# Patient Record
Sex: FEMALE | Race: WHITE | URBAN - METROPOLITAN AREA
[De-identification: names, ages, dates, MRNs, and addresses within clinical notes are randomized per-mention and may not be internally consistent; named-entity substitution may affect disease eponyms.]

---

## 2022-06-28 PROBLEM — R63.5 WEIGHT GAIN: Status: ACTIVE | Noted: 2017-11-22

## 2022-06-28 PROBLEM — N63.20 LEFT BREAST LUMP: Status: ACTIVE | Noted: 2017-11-22

## 2022-06-28 PROBLEM — N92.6 IRREGULAR MENSES: Status: ACTIVE | Noted: 2017-11-22

## 2022-06-28 PROBLEM — E28.2 PCOS (POLYCYSTIC OVARIAN SYNDROME): Status: ACTIVE | Noted: 2018-07-05

## 2022-06-28 PROBLEM — J30.9 ALLERGIC RHINITIS: Status: ACTIVE | Noted: 2022-06-28

## 2022-06-28 PROBLEM — N94.6 DYSMENORRHEA: Status: ACTIVE | Noted: 2017-11-22

## 2022-06-28 PROBLEM — N94.10 FEMALE DYSPAREUNIA: Status: ACTIVE | Noted: 2017-11-22

## 2022-06-28 PROBLEM — N94.89 HIGH-TONE PELVIC FLOOR DYSFUNCTION: Status: ACTIVE | Noted: 2017-11-22

## 2022-06-28 PROBLEM — N96 HISTORY OF RECURRENT MISCARRIAGES: Status: ACTIVE | Noted: 2021-03-13

## 2022-06-28 PROBLEM — M62.89 HIGH-TONE PELVIC FLOOR DYSFUNCTION: Status: ACTIVE | Noted: 2017-11-22

## 2022-06-29 ENCOUNTER — PATIENT MESSAGE (OUTPATIENT)
Dept: FAMILY MEDICINE CLINIC | Facility: CLINIC | Age: 32
End: 2022-06-29

## 2022-06-29 NOTE — TELEPHONE ENCOUNTER
From: Silvio Grubbs  To: Uriah Del Real DO  Sent: 6/29/2022 4:55 PM CDT  Subject: Brain Belts Dr. Vivi Starkey,    Thank you so much for being so great with my appointment. I meant to ask about drinking alcohol when on Prozac. I often will have a drink when going out with friends on the weekend. What is safe to do when taking Prozac? I didnt know if I could socially drink? Thank you.

## 2022-08-11 ENCOUNTER — PATIENT MESSAGE (OUTPATIENT)
Dept: FAMILY MEDICINE CLINIC | Facility: CLINIC | Age: 32
End: 2022-08-11

## 2022-08-11 DIAGNOSIS — N30.00 ACUTE CYSTITIS WITHOUT HEMATURIA: Primary | ICD-10-CM

## 2022-08-13 RX ORDER — SULFAMETHOXAZOLE AND TRIMETHOPRIM 800; 160 MG/1; MG/1
1 TABLET ORAL 2 TIMES DAILY
Qty: 6 TABLET | Refills: 0 | Status: SHIPPED | OUTPATIENT
Start: 2022-08-13

## 2022-08-16 ENCOUNTER — OFFICE VISIT (OUTPATIENT)
Dept: FAMILY MEDICINE CLINIC | Facility: CLINIC | Age: 32
End: 2022-08-16
Payer: COMMERCIAL

## 2022-08-16 ENCOUNTER — TELEPHONE (OUTPATIENT)
Dept: FAMILY MEDICINE CLINIC | Facility: CLINIC | Age: 32
End: 2022-08-16

## 2022-08-16 VITALS
DIASTOLIC BLOOD PRESSURE: 60 MMHG | WEIGHT: 152 LBS | BODY MASS INDEX: 23.86 KG/M2 | RESPIRATION RATE: 16 BRPM | HEART RATE: 78 BPM | SYSTOLIC BLOOD PRESSURE: 120 MMHG | HEIGHT: 67 IN | TEMPERATURE: 97 F

## 2022-08-16 DIAGNOSIS — M54.50 ACUTE BILATERAL LOW BACK PAIN WITHOUT SCIATICA: Primary | ICD-10-CM

## 2022-08-16 DIAGNOSIS — R10.9 FLANK PAIN: ICD-10-CM

## 2022-08-16 DIAGNOSIS — F41.1 GAD (GENERALIZED ANXIETY DISORDER): ICD-10-CM

## 2022-08-16 LAB
APPEARANCE: YELLOW
BILIRUBIN: NEGATIVE
GLUCOSE (URINE DIPSTICK): NEGATIVE MG/DL
KETONES (URINE DIPSTICK): NEGATIVE MG/DL
LEUKOCYTES: NEGATIVE
MULTISTIX LOT#: NORMAL NUMERIC
NITRITE, URINE: NEGATIVE
OCCULT BLOOD: NEGATIVE
PH, URINE: 7 (ref 4.5–8)
PROTEIN (URINE DIPSTICK): NEGATIVE MG/DL
SPECIFIC GRAVITY: 1.01 (ref 1–1.03)
URINE-COLOR: CLEAR
UROBILINOGEN,SEMI-QN: 0.2 MG/DL (ref 0–1.9)

## 2022-08-16 PROCEDURE — 3078F DIAST BP <80 MM HG: CPT | Performed by: FAMILY MEDICINE

## 2022-08-16 PROCEDURE — 87086 URINE CULTURE/COLONY COUNT: CPT | Performed by: FAMILY MEDICINE

## 2022-08-16 PROCEDURE — 81003 URINALYSIS AUTO W/O SCOPE: CPT | Performed by: FAMILY MEDICINE

## 2022-08-16 PROCEDURE — 3008F BODY MASS INDEX DOCD: CPT | Performed by: FAMILY MEDICINE

## 2022-08-16 PROCEDURE — 99214 OFFICE O/P EST MOD 30 MIN: CPT | Performed by: FAMILY MEDICINE

## 2022-08-16 PROCEDURE — 3074F SYST BP LT 130 MM HG: CPT | Performed by: FAMILY MEDICINE

## 2022-08-16 RX ORDER — NITROFURANTOIN 25; 75 MG/1; MG/1
CAPSULE ORAL
COMMUNITY
Start: 2022-08-12

## 2022-08-16 RX ORDER — BUPROPION HYDROCHLORIDE 300 MG/1
300 TABLET ORAL DAILY
Qty: 30 TABLET | Refills: 1 | Status: SHIPPED | OUTPATIENT
Start: 2022-08-16

## 2022-08-16 RX ORDER — SULFAMETHOXAZOLE AND TRIMETHOPRIM 800; 160 MG/1; MG/1
1 TABLET ORAL 2 TIMES DAILY
Qty: 14 TABLET | Refills: 0 | Status: SHIPPED | OUTPATIENT
Start: 2022-08-16

## 2022-08-16 NOTE — TELEPHONE ENCOUNTER
Coming in today at 15:00 to see Dr Marialuisa Villalta called [facility] to call back to see if she can keep her appointment

## 2022-08-16 NOTE — TELEPHONE ENCOUNTER
Pt calling. Recently seen at [facility] for UTI, sx have not resolved. Wants to know if she needs to be seen again.  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

## 2022-08-20 ENCOUNTER — PATIENT MESSAGE (OUTPATIENT)
Dept: FAMILY MEDICINE CLINIC | Facility: CLINIC | Age: 32
End: 2022-08-20

## 2022-08-22 ENCOUNTER — PATIENT MESSAGE (OUTPATIENT)
Dept: FAMILY MEDICINE CLINIC | Facility: CLINIC | Age: 32
End: 2022-08-22

## 2022-08-22 ENCOUNTER — TELEPHONE (OUTPATIENT)
Dept: FAMILY MEDICINE CLINIC | Facility: CLINIC | Age: 32
End: 2022-08-22

## 2022-08-22 DIAGNOSIS — N17.9 AKI (ACUTE KIDNEY INJURY) (HCC): Primary | ICD-10-CM

## 2022-08-22 DIAGNOSIS — D72.10 EOSINOPHILIA, UNSPECIFIED TYPE: ICD-10-CM

## 2022-08-22 NOTE — TELEPHONE ENCOUNTER
Dank Draper this may be interstitial nephritis caused by one of the medications. I agree with stopping both medications. I will put in the order for labs and a urine test to follow up. Let's try to get these soon. To stay hydrated. Please let me know if you have any questions.   Tomas Trevino,  8/22/2022 4:26 PM

## 2022-08-22 NOTE — TELEPHONE ENCOUNTER
LOV 8/16/22 with Dr. Altaf Gaspar. She was having urinary symptoms but urine testing was neg. Continued with persistent back and \"front\" pain and went to the [facility] ER Friday night. CT with contrast was normal as well as labs. Patient did review he own labs and creatinine and GFR are off. Her BM are not the same and not as much as usual. Denies constipation. Patient states that since the CT contrast Friday she has continued to have that warm feeling and metallic taste in her mouth. She is concerned she is having kidney issues. She spoke with a friend who is an MD. Was advised possible internal allergic reaction so she has stopped taking her bactrim and wellbutrin. Symptoms may have started prior to bactrim. Please advise on next steps. No availability in office. Instructed patient to IC if worsening.

## 2022-08-22 NOTE — TELEPHONE ENCOUNTER
From: Neeta Archer  To: Jose R DO José Luis  Sent: 8/20/2022 5:28 PM CDT  Subject: ER    Hi Dr. Sabine Roberts. I ended up going to the ER this weekend because of the pain I was experiencing. My ct scan was clear, but I am concerned with some of the lab results. My creatinine was at 1.21  My egfr was 46  And my eosinophils was a 6.7%    I am a little worried with these scores.

## 2022-08-22 NOTE — TELEPHONE ENCOUNTER
She did schedule a fup appointment she  went to [facility] on 08/19 for Issues with her kidneys she still has questions and wants some advise on how to proceed. Scheduled on [email or web address] with [email or web address]

## 2022-08-22 NOTE — TELEPHONE ENCOUNTER
From: Andrez Medina  Sent: 8/22/2022 7:45 AM CDT  To: [facility] Clinical Staff  Subject: ER    I did call Dr. Kathi Lesch and explained symptoms over the weekend. He is wondering if I am having an allergic reaction to the medications I am taking. I also started experiencing metallic taste last night and heat waves similar to the feeling you get with iodine in the system during CT. I made an appointment for Sept. 9th but am wondering if this is more of a medical emergency where I need to go back to the ER. I work today and my lunch is at 10:40. I am nervous my kidneys are not functioning correctly so just trying to find guidance of what to do in the meantime since I feel so sick. Thank you!

## 2022-08-24 LAB
ABSOLUTE BASOPHILS: 39 CELLS/UL (ref 0–200)
ABSOLUTE EOSINOPHILS: 323 CELLS/UL (ref 15–500)
ABSOLUTE LYMPHOCYTES: 1740 CELLS/UL (ref 850–3900)
ABSOLUTE MONOCYTES: 554 CELLS/UL (ref 200–950)
ABSOLUTE NEUTROPHILS: 2244 CELLS/UL (ref 1500–7800)
APPEARANCE: CLEAR
BASOPHILS: 0.8 %
BILIRUBIN: NEGATIVE
BUN/CREATININE RATIO: 11 (CALC) (ref 6–22)
BUN: 11 MG/DL (ref 7–25)
CALCIUM: 9.7 MG/DL (ref 8.6–10.2)
CARBON DIOXIDE: 27 MMOL/L (ref 20–32)
CHLORIDE: 106 MMOL/L (ref 98–110)
COLOR: YELLOW
CREATININE: 1 MG/DL (ref 0.5–0.97)
EGFR: 78 ML/MIN/1.73M2
EOSINOPHILS: 6.6 %
GLUCOSE: 88 MG/DL (ref 65–99)
GLUCOSE: NEGATIVE
HEMATOCRIT: 40 % (ref 35–45)
HEMOGLOBIN: 12.9 G/DL (ref 11.7–15.5)
KETONES: NEGATIVE
LEUKOCYTE ESTERASE: NEGATIVE
LYMPHOCYTES: 35.5 %
MCH: 29.4 PG (ref 27–33)
MCHC: 32.3 G/DL (ref 32–36)
MCV: 91.1 FL (ref 80–100)
MONOCYTES: 11.3 %
MPV: 10.7 FL (ref 7.5–12.5)
NEUTROPHILS: 45.8 %
NITRITE: NEGATIVE
OCCULT BLOOD: NEGATIVE
PH: 5.5 (ref 5–8)
PLATELET COUNT: 360 THOUSAND/UL (ref 140–400)
POTASSIUM: 4.6 MMOL/L (ref 3.5–5.3)
PROTEIN: NEGATIVE
RDW: 12.1 % (ref 11–15)
RED BLOOD CELL COUNT: 4.39 MILLION/UL (ref 3.8–5.1)
SODIUM: 139 MMOL/L (ref 135–146)
SPECIFIC GRAVITY: 1.01 (ref 1–1.03)
WHITE BLOOD CELL COUNT: 4.9 THOUSAND/UL (ref 3.8–10.8)

## 2022-08-25 NOTE — TELEPHONE ENCOUNTER
Pt reports new onset chest pain. Has h/o anxiety. Recently stopped Wellbutrin. Pain is constant. Pain started two nights ago. Pain rated 4/10, described as dull. Feels like heartburn. Denies SOB. She feels she can't take any medication due to suspected kidney issue. Is feeling anxious due to health issues going on. She is not sure if this is all connected to her kidney issue or if it is okay for her to take anything for it?     Routed to Dr. Erin Hayden     Pt requests we leave a detailed message with response

## 2022-08-25 NOTE — TELEPHONE ENCOUNTER
1. Good news- the eosinophils have come into normal range and Creatinine is also headed back into normal range. I think we should repeat again in a week to insure this is continuing in the right direction. 2. If feels like heart burn, can try famotidine 10 mg BID or 20mg once daily. If chest pain has any concerning features, then consider being evaluated. Please let me know if you have any questions.   Clyde Bynum, DO 8/25/2022 5:11 PM

## 2022-08-25 NOTE — TELEPHONE ENCOUNTER
Pt went and had the blood work done now she is having chest pain. Big toe is numb on the right side. She said she has been having kidney issues and avoiding OTC meds for indigestion.

## 2022-08-26 NOTE — TELEPHONE ENCOUNTER
Reviewed results and directives with pt. She verbalized understanding.     Pt's preferred lab is [facility]. Lab order for BMP and CBC reissued for [facility].

## 2022-09-01 ENCOUNTER — TELEPHONE (OUTPATIENT)
Dept: FAMILY MEDICINE CLINIC | Facility: CLINIC | Age: 32
End: 2022-09-01

## 2022-09-01 DIAGNOSIS — D72.10 EOSINOPHILIA, UNSPECIFIED TYPE: ICD-10-CM

## 2022-09-01 DIAGNOSIS — N17.9 AKI (ACUTE KIDNEY INJURY) (HCC): Primary | ICD-10-CM

## 2022-09-01 LAB
ABSOLUTE BASOPHILS: 43 CELLS/UL (ref 0–200)
ABSOLUTE EOSINOPHILS: 274 CELLS/UL (ref 15–500)
ABSOLUTE LYMPHOCYTES: 1354 CELLS/UL (ref 850–3900)
ABSOLUTE MONOCYTES: 785 CELLS/UL (ref 200–950)
ABSOLUTE NEUTROPHILS: 4745 CELLS/UL (ref 1500–7800)
BASOPHILS: 0.6 %
BUN/CREATININE RATIO: 10 (CALC) (ref 6–22)
BUN: 10 MG/DL (ref 7–25)
CALCIUM: 9.5 MG/DL (ref 8.6–10.2)
CARBON DIOXIDE: 28 MMOL/L (ref 20–32)
CHLORIDE: 107 MMOL/L (ref 98–110)
CREATININE: 1.04 MG/DL (ref 0.5–0.97)
EGFR: 74 ML/MIN/1.73M2
EOSINOPHILS: 3.8 %
GLUCOSE: 90 MG/DL (ref 65–99)
HEMATOCRIT: 40.1 % (ref 35–45)
HEMOGLOBIN: 12.9 G/DL (ref 11.7–15.5)
LYMPHOCYTES: 18.8 %
MCH: 29.3 PG (ref 27–33)
MCHC: 32.2 G/DL (ref 32–36)
MCV: 90.9 FL (ref 80–100)
MONOCYTES: 10.9 %
MPV: 10.7 FL (ref 7.5–12.5)
NEUTROPHILS: 65.9 %
PLATELET COUNT: 345 THOUSAND/UL (ref 140–400)
POTASSIUM: 4.4 MMOL/L (ref 3.5–5.3)
RDW: 12.3 % (ref 11–15)
RED BLOOD CELL COUNT: 4.41 MILLION/UL (ref 3.8–5.1)
SODIUM: 140 MMOL/L (ref 135–146)
WHITE BLOOD CELL COUNT: 7.2 THOUSAND/UL (ref 3.8–10.8)

## 2022-09-01 NOTE — TELEPHONE ENCOUNTER
BMP please  Please let me know if you have any questions.   [id number], DO 9/1/2022 5:35 PM

## 2022-09-01 NOTE — TELEPHONE ENCOUNTER
Pt states she completed her labs. Pt would like to speak to someone. Pt believes her kidneys are not getting better, in fact worse. Please advise. Call work ph with ext. Not able to get calls on cell.

## 2022-09-01 NOTE — TELEPHONE ENCOUNTER
Creatinine has stabilized. At this time I think we give the kidneys more time to recover. Eosinophils have gone back to normal and it is reassuring the creatinine has stabilized right around 1. This is an improvement from 1.2 where she was previously. I would recommend we recheck this again in 2 weeks. How is the patient feeling?

## 2022-09-01 NOTE — TELEPHONE ENCOUNTER
Patient notified of labs. She reports she's had some back pain. She feels better knowing labs are improving. Routed to Dr. Lesli Umanzor - which labs should be ordered for 2 week repeat?

## 2022-09-08 ENCOUNTER — TELEPHONE (OUTPATIENT)
Dept: FAMILY MEDICINE CLINIC | Facility: CLINIC | Age: 32
End: 2022-09-08

## 2022-09-08 ENCOUNTER — PATIENT MESSAGE (OUTPATIENT)
Dept: FAMILY MEDICINE CLINIC | Facility: CLINIC | Age: 32
End: 2022-09-08

## 2022-09-08 NOTE — TELEPHONE ENCOUNTER
From: Caitlyn Leung  Sent: 9/8/2022 3:16 PM CDT  To: [facility] 03 Clinical Staff  Subject: Test results from ER    Other lab results

## 2022-09-08 NOTE — TELEPHONE ENCOUNTER
Pt was seen at [facility] 9/4/22. Pt having chest pain and got EKG. Pt would like to speak to nurse about plan of care. Pt feels like she is getting disregarded at the ER because she deals with anxiety. She does not think it is panic attacks. Pt is wearing the chest halter monitor currently. Please advise Pt.

## 2022-09-08 NOTE — TELEPHONE ENCOUNTER
Called and talked to patient she has severe CP was in [address] [place] [address] work up was normal and she was released and placed on a Holter monitor for 48 hours we went over her EKG and what she should do from this point

## 2022-09-14 ENCOUNTER — OFFICE VISIT (OUTPATIENT)
Dept: NEPHROLOGY | Facility: CLINIC | Age: 32
End: 2022-09-14
Payer: COMMERCIAL

## 2022-09-14 VITALS — BODY MASS INDEX: 23 KG/M2 | SYSTOLIC BLOOD PRESSURE: 116 MMHG | WEIGHT: 150 LBS | DIASTOLIC BLOOD PRESSURE: 72 MMHG

## 2022-09-14 DIAGNOSIS — R79.89 ELEVATED SERUM CREATININE: Primary | ICD-10-CM

## 2022-09-14 PROCEDURE — 3078F DIAST BP <80 MM HG: CPT | Performed by: INTERNAL MEDICINE

## 2022-09-14 PROCEDURE — 99203 OFFICE O/P NEW LOW 30 MIN: CPT | Performed by: INTERNAL MEDICINE

## 2022-09-14 PROCEDURE — 3074F SYST BP LT 130 MM HG: CPT | Performed by: INTERNAL MEDICINE

## 2022-09-15 LAB
BUN: 13 MG/DL (ref 7–25)
CALCIUM: 9.6 MG/DL (ref 8.6–10.2)
CARBON DIOXIDE: 26 MMOL/L (ref 20–32)
CHLORIDE: 107 MMOL/L (ref 98–110)
CREATININE: 0.84 MG/DL (ref 0.5–0.97)
EGFR: 96 ML/MIN/1.73M2
GLUCOSE: 89 MG/DL (ref 65–99)
POTASSIUM: 4.3 MMOL/L (ref 3.5–5.3)
SODIUM: 139 MMOL/L (ref 135–146)

## 2022-10-06 ENCOUNTER — TELEPHONE (OUTPATIENT)
Dept: FAMILY MEDICINE CLINIC | Facility: CLINIC | Age: 32
End: 2022-10-06

## 2022-10-06 NOTE — TELEPHONE ENCOUNTER
C/o severe chest pain seen in ED cardiac work up neg but at night having chest pain at night it keeps her up at night but he O2 Sat gets down to 80 she thinks she might have asthma because her mother had adult onset asthma. She was asking for an inhaler, but no wheezing. She is wearing a 30 day holter and is having a Stress test next week also a CT to R/O chest problems. I suggested she try Ibuprofen for the pain prior to going to bed. She said the difficulty breathing was nasal so I asked her to try a nasal spray before bed also. She is asking to get lab work done before her appointment with Dr Yelitza Sutherland.

## 2022-10-06 NOTE — TELEPHONE ENCOUNTER
Pt is having intense chest pain non heart related possible asthma. She is requesting to speak with a nurse for a possible inhaler script.

## 2022-10-07 NOTE — TELEPHONE ENCOUNTER
Kidney not showing any issues at all on the most recent blood work. Sounds like she might need to be seen sooner than end of the month. I can see today at 10am if needed.

## 2022-10-07 NOTE — TELEPHONE ENCOUNTER
Patient returned call. Patient notified of below directives. Will schedule sooner f/u. Unfortunately missed 10 am appt opportunity for today. DISPLAY PLAN FREE TEXT

## 2022-10-10 ENCOUNTER — TELEPHONE (OUTPATIENT)
Dept: FAMILY MEDICINE CLINIC | Facility: CLINIC | Age: 32
End: 2022-10-10

## 2022-10-10 NOTE — TELEPHONE ENCOUNTER
Pt is requesting an order to get blood tests prior to her appt tomorrow 10/11/22. Please advise Pt if these can be ordered or if she should wait until after she sees Dr. Nat Soriano tomorrow.

## 2022-10-11 ENCOUNTER — OFFICE VISIT (OUTPATIENT)
Dept: FAMILY MEDICINE CLINIC | Facility: CLINIC | Age: 32
End: 2022-10-11
Payer: COMMERCIAL

## 2022-10-11 ENCOUNTER — TELEPHONE (OUTPATIENT)
Dept: FAMILY MEDICINE CLINIC | Facility: CLINIC | Age: 32
End: 2022-10-11

## 2022-10-11 VITALS
SYSTOLIC BLOOD PRESSURE: 104 MMHG | HEIGHT: 67 IN | WEIGHT: 151 LBS | HEART RATE: 72 BPM | BODY MASS INDEX: 23.7 KG/M2 | OXYGEN SATURATION: 90 % | DIASTOLIC BLOOD PRESSURE: 58 MMHG | TEMPERATURE: 98 F | RESPIRATION RATE: 12 BRPM

## 2022-10-11 DIAGNOSIS — R53.83 FATIGUE, UNSPECIFIED TYPE: Primary | ICD-10-CM

## 2022-10-11 DIAGNOSIS — R07.89 ATYPICAL CHEST PAIN: ICD-10-CM

## 2022-10-11 DIAGNOSIS — R06.02 SHORTNESS OF BREATH: ICD-10-CM

## 2022-10-11 PROCEDURE — 99215 OFFICE O/P EST HI 40 MIN: CPT | Performed by: FAMILY MEDICINE

## 2022-10-11 PROCEDURE — 3074F SYST BP LT 130 MM HG: CPT | Performed by: FAMILY MEDICINE

## 2022-10-11 PROCEDURE — 3008F BODY MASS INDEX DOCD: CPT | Performed by: FAMILY MEDICINE

## 2022-10-11 PROCEDURE — 3078F DIAST BP <80 MM HG: CPT | Performed by: FAMILY MEDICINE

## 2022-10-11 RX ORDER — OMEPRAZOLE 20 MG/1
20 CAPSULE, DELAYED RELEASE ORAL
Qty: 60 CAPSULE | Refills: 1 | Status: SHIPPED | OUTPATIENT
Start: 2022-10-11

## 2022-10-11 RX ORDER — ALBUTEROL SULFATE 90 UG/1
2 AEROSOL, METERED RESPIRATORY (INHALATION) EVERY 6 HOURS PRN
Qty: 18 G | Refills: 0 | Status: SHIPPED | OUTPATIENT
Start: 2022-10-11

## 2022-10-11 NOTE — TELEPHONE ENCOUNTER
Received fax from [facility] with patient's US Venous Duplex Lower EXT BI test results, placed in Dr. Cinthya Campa in box

## 2022-10-13 LAB
ABSOLUTE BASOPHILS: 48 CELLS/UL (ref 0–200)
ABSOLUTE EOSINOPHILS: 348 CELLS/UL (ref 15–500)
ABSOLUTE LYMPHOCYTES: 1848 CELLS/UL (ref 850–3900)
ABSOLUTE MONOCYTES: 618 CELLS/UL (ref 200–950)
ABSOLUTE NEUTROPHILS: 3138 CELLS/UL (ref 1500–7800)
ALBUMIN/GLOBULIN RATIO: 1.7 (CALC) (ref 1–2.5)
ALBUMIN: 4.2 G/DL (ref 3.6–5.1)
ALKALINE PHOSPHATASE: 62 U/L (ref 31–125)
ALT: 11 U/L (ref 6–29)
AST: 15 U/L (ref 10–30)
BASOPHILS: 0.8 %
BILIRUBIN, TOTAL: 0.6 MG/DL (ref 0.2–1.2)
BUN: 14 MG/DL (ref 7–25)
CALCIUM: 9.6 MG/DL (ref 8.6–10.2)
CARBON DIOXIDE: 22 MMOL/L (ref 20–32)
CHLORIDE: 107 MMOL/L (ref 98–110)
CREATININE: 0.93 MG/DL (ref 0.5–0.97)
EGFR: 85 ML/MIN/1.73M2
EOSINOPHILS: 5.8 %
FERRITIN: 35 NG/ML (ref 16–154)
GLOBULIN: 2.5 G/DL (CALC) (ref 1.9–3.7)
GLUCOSE: 85 MG/DL (ref 65–99)
HEMATOCRIT: 41.3 % (ref 35–45)
HEMOGLOBIN: 13.6 G/DL (ref 11.7–15.5)
LYMPHOCYTES: 30.8 %
MCH: 29.8 PG (ref 27–33)
MCHC: 32.9 G/DL (ref 32–36)
MCV: 90.6 FL (ref 80–100)
MONOCYTES: 10.3 %
MPV: 11 FL (ref 7.5–12.5)
NEUTROPHILS: 52.3 %
PLATELET COUNT: 350 THOUSAND/UL (ref 140–400)
POTASSIUM: 4.5 MMOL/L (ref 3.5–5.3)
PROTEIN, TOTAL: 6.7 G/DL (ref 6.1–8.1)
RDW: 12.3 % (ref 11–15)
RED BLOOD CELL COUNT: 4.56 MILLION/UL (ref 3.8–5.1)
SODIUM: 138 MMOL/L (ref 135–146)
TSH W/REFLEX TO FT4: 2.17 MIU/L
WHITE BLOOD CELL COUNT: 6 THOUSAND/UL (ref 3.8–10.8)

## 2022-11-01 ENCOUNTER — PATIENT MESSAGE (OUTPATIENT)
Dept: FAMILY MEDICINE CLINIC | Facility: CLINIC | Age: 32
End: 2022-11-01

## 2022-11-01 DIAGNOSIS — Z01.419 WELL WOMAN EXAM WITH ROUTINE GYNECOLOGICAL EXAM: Primary | ICD-10-CM

## 2022-11-01 DIAGNOSIS — R07.89 ATYPICAL CHEST PAIN: ICD-10-CM

## 2022-11-01 DIAGNOSIS — R06.02 SHORTNESS OF BREATH: ICD-10-CM

## 2022-11-02 ENCOUNTER — OFFICE VISIT (OUTPATIENT)
Facility: CLINIC | Age: 32
End: 2022-11-02
Payer: COMMERCIAL

## 2022-11-02 ENCOUNTER — TELEPHONE (OUTPATIENT)
Facility: CLINIC | Age: 32
End: 2022-11-02

## 2022-11-02 ENCOUNTER — HOSPITAL ENCOUNTER (OUTPATIENT)
Dept: GENERAL RADIOLOGY | Facility: HOSPITAL | Age: 32
Discharge: HOME | End: 2022-11-02
Attending: FAMILY MEDICINE
Payer: COMMERCIAL

## 2022-11-02 VITALS
DIASTOLIC BLOOD PRESSURE: 70 MMHG | HEIGHT: 67 IN | BODY MASS INDEX: 23.34 KG/M2 | HEART RATE: 73 BPM | WEIGHT: 148.69 LBS | SYSTOLIC BLOOD PRESSURE: 103 MMHG

## 2022-11-02 DIAGNOSIS — R07.89 OTHER CHEST PAIN: Primary | ICD-10-CM

## 2022-11-02 DIAGNOSIS — R10.13 EPIGASTRIC ABDOMINAL PAIN: Primary | ICD-10-CM

## 2022-11-02 DIAGNOSIS — R07.89 ATYPICAL CHEST PAIN: ICD-10-CM

## 2022-11-02 DIAGNOSIS — R06.02 SHORTNESS OF BREATH: ICD-10-CM

## 2022-11-02 PROCEDURE — 3074F SYST BP LT 130 MM HG: CPT | Performed by: STUDENT IN AN ORGANIZED HEALTH CARE EDUCATION/TRAINING PROGRAM

## 2022-11-02 PROCEDURE — 3008F BODY MASS INDEX DOCD: CPT | Performed by: STUDENT IN AN ORGANIZED HEALTH CARE EDUCATION/TRAINING PROGRAM

## 2022-11-02 PROCEDURE — 3078F DIAST BP <80 MM HG: CPT | Performed by: STUDENT IN AN ORGANIZED HEALTH CARE EDUCATION/TRAINING PROGRAM

## 2022-11-02 PROCEDURE — 71046 X-RAY EXAM CHEST 2 VIEWS: CPT | Performed by: FAMILY MEDICINE

## 2022-11-02 PROCEDURE — 99204 OFFICE O/P NEW MOD 45 MIN: CPT | Performed by: STUDENT IN AN ORGANIZED HEALTH CARE EDUCATION/TRAINING PROGRAM

## 2022-11-02 RX ORDER — FLUTICASONE FUROATE 200 UG/1
1 POWDER RESPIRATORY (INHALATION) DAILY
COMMUNITY
Start: 2022-10-29

## 2022-11-02 NOTE — PATIENT INSTRUCTIONS
1. Schedule upper endoscopy (EGD) with MAC [Diagnosis: epigastric pain]    2. You MAY need to go for COVID testing 72 hours before procedure. The testing team will call you a few days before your procedure to discuss with you if testing is required. If you are asked to go for COVID testing and do not completed the test, the procedure cannot be performed. 3. If you start any NEW medication after your visit today, please notify us. Certain medications will need to be held before the procedure, or the procedure cannot be performed.

## 2022-11-02 NOTE — TELEPHONE ENCOUNTER
Scheduled for:  Jori Braden [id number]   Provider Name:  Renay Montemayor  Date:  11/18/2022  Location:  [facility]   Sedation:  Mac   Time:  3:30 Pm  (pt is aware to arrive at 2:30 Pm)   Prep: Egd -Npo 3 hrs prior to procedure  Prep instructions were given to pt in the office, pt verbalized understanding. Meds/Allergies Reconciled?:  Physician reviewed     Diagnosis with codes:  Epigastric pain R10.13  Was patient informed to call insurance with codes (Y/N):  Yes, I confirmed [address] [place] [address] insurance with the patient. The patient also verbally understands to call her insurance to check for pre-cert, codes were given on prep instructions. Referral sent?:  Yes  [facility] or [address] notified?: Nagi Pena approved this  I sent an electronic request to Endo Scheduling and received a confirmation today. Medication Orders: This patient verbally confirmed that she is not taking:   Iron, blood thinners, BP meds, and is not diabetic   Not latex allergy, has PCN allergy and does not have a pacemaker Pt is aware to NOT take iron pills, herbal meds and diet supplements for 7 days before exam. Also to NOT take any form of alcohol, recreational drugs and any forms of ED meds 24 hours before exam.    Misc Orders:  Patient was informed that they will need a COVID 19 test prior to their procedure. Patient verbally understood & will await a phone call from [facility] to schedule.       Further instructions given by staff:
Gen: middle aged male, on oxygen nasal cannula, getting dialysis, mild distress, short of breath while talking  HEENT: MMM  CVS: S1S2 RRR  PULM: crackles at bases, decreased lung sounds at the bases  ABD: soft, nontender, no rebound, no guarding  EXTREM: no edema  NEURO: intact  PSYCH: appropriate  SKIN: warm to touch

## 2022-11-04 ENCOUNTER — PATIENT MESSAGE (OUTPATIENT)
Facility: CLINIC | Age: 32
End: 2022-11-04

## 2022-11-09 ENCOUNTER — TELEPHONE (OUTPATIENT)
Facility: CLINIC | Age: 32
End: 2022-11-09

## 2022-11-09 ENCOUNTER — TELEPHONE (OUTPATIENT)
Dept: FAMILY MEDICINE CLINIC | Facility: CLINIC | Age: 32
End: 2022-11-09

## 2022-11-09 DIAGNOSIS — M25.531 BILATERAL WRIST PAIN: Primary | ICD-10-CM

## 2022-11-09 DIAGNOSIS — R10.9 ABDOMINAL PAIN, UNSPECIFIED ABDOMINAL LOCATION: ICD-10-CM

## 2022-11-09 DIAGNOSIS — R19.7 DIARRHEA, UNSPECIFIED TYPE: Primary | ICD-10-CM

## 2022-11-09 DIAGNOSIS — M25.532 BILATERAL WRIST PAIN: Primary | ICD-10-CM

## 2022-11-09 NOTE — TELEPHONE ENCOUNTER
Pt states now has bad bilateral wrist/hand pain and wanted to know if was from the Omeprazole. Advised its not a usual side effect. Pt saw GI any they do not thing her chest pains are from [place]- advised that they would order an endoscopy- if she wanted. Pt states has seen many specialist and no one can figure out what's wrong with her. Pt wanted to know if all these symptoms are auto immune related and wanted to know if Dr. Remy Hanley can order labs for auto immune.

## 2022-11-09 NOTE — TELEPHONE ENCOUNTER
Pt states that she is having hand and wrist pain and is also having issues with diarrhea. Pt thinks it might be due to omeprazole. Please call.

## 2022-11-09 NOTE — TELEPHONE ENCOUNTER
Patient believes she may be having a reaction to Omeprazole. Patient has been having severe hand and wrist pain for the past couple days and diarrhea for the past 2 days.   Patient read that if you're taking that medication and having those symptoms you should contact your doctor's office

## 2022-11-09 NOTE — TELEPHONE ENCOUNTER
Dr. Miriam Allison,    Patient called stating she has been having bad wrist/hand pain and diarrhea after starting omeprazole. She wants to know if this could be causing her symptoms. Please advise, thank you.

## 2022-11-10 NOTE — TELEPHONE ENCOUNTER
I spoke to the patient. We discussed her symptoms. She is having right wrist pain/pain in her thumb. She also has had 2 days of diarrhea. Has been taking Omeprazole for > 1.5 months at this time. I discussed with her that these symptoms are unlikely related to Omeprazole, especially the joint pain but anything is possible. We discussed her concerns for underlying autoimmune disease. I will check celiac serologies, CRP, fecal calprotectin and we will proceed with EGD as previously scheduled. She will stop Omeprazole at this time.     Jovan Cr MD

## 2022-11-14 LAB
ANA SCREEN, IFA: NEGATIVE
C-REACTIVE PROTEIN: 0.6 MG/L
RHEUMATOID FACTOR: <14 IU/ML
SED RATE BY MODIFIED$WESTERGREN: 2 MM/H

## 2022-11-15 ENCOUNTER — LAB ENCOUNTER (OUTPATIENT)
Dept: LAB | Facility: HOSPITAL | Age: 32
End: 2022-11-15
Attending: STUDENT IN AN ORGANIZED HEALTH CARE EDUCATION/TRAINING PROGRAM
Payer: COMMERCIAL

## 2022-11-15 DIAGNOSIS — Z01.818 PRE-OP TESTING: ICD-10-CM

## 2022-11-15 LAB
IGA SERPL-MCNC: 273 MG/DL (ref 70–312)
IGM SERPL-MCNC: 114 MG/DL (ref 43–279)
IMMUNOGLOBULIN PNL SER-MCNC: 966 MG/DL (ref 791–1643)
VIT B12 SERPL-MCNC: 417 PG/ML (ref 193–986)

## 2022-11-15 PROCEDURE — 86364 TISS TRNSGLTMNASE EA IG CLAS: CPT | Performed by: STUDENT IN AN ORGANIZED HEALTH CARE EDUCATION/TRAINING PROGRAM

## 2022-11-15 PROCEDURE — 82784 ASSAY IGA/IGD/IGG/IGM EACH: CPT | Performed by: STUDENT IN AN ORGANIZED HEALTH CARE EDUCATION/TRAINING PROGRAM

## 2022-11-15 PROCEDURE — 36415 COLL VENOUS BLD VENIPUNCTURE: CPT | Performed by: STUDENT IN AN ORGANIZED HEALTH CARE EDUCATION/TRAINING PROGRAM

## 2022-11-15 PROCEDURE — 82607 VITAMIN B-12: CPT | Performed by: STUDENT IN AN ORGANIZED HEALTH CARE EDUCATION/TRAINING PROGRAM

## 2022-11-16 ENCOUNTER — TELEPHONE (OUTPATIENT)
Facility: CLINIC | Age: 32
End: 2022-11-16

## 2022-11-16 LAB — SARS-COV-2 RNA RESP QL NAA+PROBE: NOT DETECTED

## 2022-11-16 NOTE — TELEPHONE ENCOUNTER
Immunization chart prep completed.  Immunization records verified.  [place] Pay due for All Vacinations Up To Date No Vaccinations Needed   Patient states she is stuffy and has 11/18/2022 EGD - states she has sinus issue and wanted to know if this will interfere with procedure. Please call - ok to leave detailed message. Thank you.

## 2022-11-17 ENCOUNTER — OFFICE VISIT (OUTPATIENT)
Dept: FAMILY MEDICINE CLINIC | Facility: CLINIC | Age: 32
End: 2022-11-17
Payer: COMMERCIAL

## 2022-11-17 ENCOUNTER — ANESTHESIA EVENT (OUTPATIENT)
Dept: ENDOSCOPY | Facility: HOSPITAL | Age: 32
End: 2022-11-17
Payer: COMMERCIAL

## 2022-11-17 VITALS
TEMPERATURE: 98 F | RESPIRATION RATE: 18 BRPM | DIASTOLIC BLOOD PRESSURE: 68 MMHG | BODY MASS INDEX: 23.54 KG/M2 | WEIGHT: 150 LBS | HEART RATE: 61 BPM | SYSTOLIC BLOOD PRESSURE: 100 MMHG | HEIGHT: 67 IN | OXYGEN SATURATION: 100 %

## 2022-11-17 DIAGNOSIS — R22.31 LUMP OF SKIN OF RIGHT UPPER EXTREMITY: Primary | ICD-10-CM

## 2022-11-17 PROCEDURE — 99213 OFFICE O/P EST LOW 20 MIN: CPT | Performed by: STUDENT IN AN ORGANIZED HEALTH CARE EDUCATION/TRAINING PROGRAM

## 2022-11-17 PROCEDURE — 3008F BODY MASS INDEX DOCD: CPT | Performed by: STUDENT IN AN ORGANIZED HEALTH CARE EDUCATION/TRAINING PROGRAM

## 2022-11-17 PROCEDURE — 3074F SYST BP LT 130 MM HG: CPT | Performed by: STUDENT IN AN ORGANIZED HEALTH CARE EDUCATION/TRAINING PROGRAM

## 2022-11-17 PROCEDURE — 3078F DIAST BP <80 MM HG: CPT | Performed by: STUDENT IN AN ORGANIZED HEALTH CARE EDUCATION/TRAINING PROGRAM

## 2022-11-17 NOTE — TELEPHONE ENCOUNTER
Seasonal allergies are not a problem prior to the EGD. I attempted to call her this morning to further discuss but she did not answer. If she is feeling better this morning there is no contraindication to proceeding with upper endoscopy. If she calls back let her know the above. I told the patient to call the office/send a mychart to update us.

## 2022-11-18 ENCOUNTER — LAB ENCOUNTER (OUTPATIENT)
Dept: LAB | Facility: HOSPITAL | Age: 32
End: 2022-11-18
Attending: STUDENT IN AN ORGANIZED HEALTH CARE EDUCATION/TRAINING PROGRAM
Payer: COMMERCIAL

## 2022-11-18 ENCOUNTER — HOSPITAL ENCOUNTER (OUTPATIENT)
Facility: HOSPITAL | Age: 32
Setting detail: HOSPITAL OUTPATIENT SURGERY
Discharge: HOME | End: 2022-11-18
Attending: STUDENT IN AN ORGANIZED HEALTH CARE EDUCATION/TRAINING PROGRAM | Admitting: STUDENT IN AN ORGANIZED HEALTH CARE EDUCATION/TRAINING PROGRAM
Payer: COMMERCIAL

## 2022-11-18 ENCOUNTER — ANESTHESIA (OUTPATIENT)
Dept: ENDOSCOPY | Facility: HOSPITAL | Age: 32
End: 2022-11-18
Payer: COMMERCIAL

## 2022-11-18 VITALS
BODY MASS INDEX: 23.23 KG/M2 | RESPIRATION RATE: 22 BRPM | WEIGHT: 148 LBS | TEMPERATURE: 98 F | SYSTOLIC BLOOD PRESSURE: 104 MMHG | HEART RATE: 68 BPM | HEIGHT: 67 IN | OXYGEN SATURATION: 98 % | DIASTOLIC BLOOD PRESSURE: 67 MMHG

## 2022-11-18 DIAGNOSIS — R19.7 DIARRHEA OF PRESUMED INFECTIOUS ORIGIN: Primary | ICD-10-CM

## 2022-11-18 DIAGNOSIS — R10.13 EPIGASTRIC ABDOMINAL PAIN: ICD-10-CM

## 2022-11-18 DIAGNOSIS — Z01.818 PRE-OP TESTING: Primary | ICD-10-CM

## 2022-11-18 LAB
B-HCG UR QL: NEGATIVE
TTG IGA SER-ACNC: 2.9 U/ML (ref ?–7)

## 2022-11-18 PROCEDURE — 0DB78ZX EXCISION OF STOMACH, PYLORUS, VIA NATURAL OR ARTIFICIAL OPENING ENDOSCOPIC, DIAGNOSTIC: ICD-10-PCS | Performed by: STUDENT IN AN ORGANIZED HEALTH CARE EDUCATION/TRAINING PROGRAM

## 2022-11-18 PROCEDURE — 0DB58ZX EXCISION OF ESOPHAGUS, VIA NATURAL OR ARTIFICIAL OPENING ENDOSCOPIC, DIAGNOSTIC: ICD-10-PCS | Performed by: STUDENT IN AN ORGANIZED HEALTH CARE EDUCATION/TRAINING PROGRAM

## 2022-11-18 PROCEDURE — 0DB98ZX EXCISION OF DUODENUM, VIA NATURAL OR ARTIFICIAL OPENING ENDOSCOPIC, DIAGNOSTIC: ICD-10-PCS | Performed by: STUDENT IN AN ORGANIZED HEALTH CARE EDUCATION/TRAINING PROGRAM

## 2022-11-18 PROCEDURE — 83993 ASSAY FOR CALPROTECTIN FECAL: CPT

## 2022-11-18 PROCEDURE — 43239 EGD BIOPSY SINGLE/MULTIPLE: CPT | Performed by: STUDENT IN AN ORGANIZED HEALTH CARE EDUCATION/TRAINING PROGRAM

## 2022-11-18 RX ORDER — SODIUM CHLORIDE, SODIUM LACTATE, POTASSIUM CHLORIDE, CALCIUM CHLORIDE 600; 310; 30; 20 MG/100ML; MG/100ML; MG/100ML; MG/100ML
INJECTION, SOLUTION INTRAVENOUS CONTINUOUS
OUTPATIENT
Start: 2022-11-18

## 2022-11-18 RX ORDER — SODIUM CHLORIDE, SODIUM LACTATE, POTASSIUM CHLORIDE, CALCIUM CHLORIDE 600; 310; 30; 20 MG/100ML; MG/100ML; MG/100ML; MG/100ML
INJECTION, SOLUTION INTRAVENOUS CONTINUOUS
Status: DISCONTINUED | OUTPATIENT
Start: 2022-11-18 | End: 2022-11-18

## 2022-11-18 RX ORDER — NALOXONE HYDROCHLORIDE 0.4 MG/ML
80 INJECTION, SOLUTION INTRAMUSCULAR; INTRAVENOUS; SUBCUTANEOUS AS NEEDED
OUTPATIENT
Start: 2022-11-18 | End: 2022-11-18

## 2022-11-18 RX ADMIN — SODIUM CHLORIDE, SODIUM LACTATE, POTASSIUM CHLORIDE, CALCIUM CHLORIDE: 600; 310; 30; 20 INJECTION, SOLUTION INTRAVENOUS at 10:25:00

## 2022-11-18 NOTE — DISCHARGE INSTRUCTIONS
Home Care Instructions for Gastroscopy with Sedation    Diet:  - Resume your regular diet as tolerated unless otherwise instructed. - Start with light meals to minimize bloating.  - Do not drink alcohol today. Medication:  - If you have questions about resuming your normal medications, please contact your Primary Care Physician. Activities:  - Take it easy today. Do not return to work today. - Do not drive today. - Do not operate any machinery today (including kitchen equipment). Gastroscopy:  - You may have a sore throat for 2-3 days following the exam. This is normal. Gargling with warm salt water (1/2 tsp salt to 1 glass warm water) or using throat lozenges will help. - If you experience any sharp pain in your neck, abdomen or chest, vomiting of blood, oral temperature over 100 degrees Fahrenheit, light-headedness or dizziness, or any other problems, contact your doctor. **If unable to reach your doctor, please go to the [facility] Emergency Room**    - Your referring physician will receive a full report of your examination.  - If you do not hear from your doctor's office within two weeks of your biopsy, please call them for your results. You may be able to see your laboratory results in [facility] between 4 and 7 business days. In some cases, your physician may not have viewed the results before they are released to [address] If you have questions regarding your results contact the physician who ordered the test/exam by phone or via [address] by choosing \"Ask a Medical Question. \"

## 2022-11-18 NOTE — OPERATIVE REPORT
ESOPHAGOGASTRODUODENOSCOPY (EGD) REPORT    Prachi Lei     1990 Age 29year old   PCP Clyde Bynum DO Endoscopist Jean Claude Nixon MD       Date of procedure: 22    Procedure: EGD w/ biopsies    Pre-operative diagnosis: atypical chest pain    Post-operative diagnosis: same    Medications: MAC    Complications: none    Procedure:  Informed consent was obtained from the patient after the risks of the procedure were discussed, including but not limited to bleeding, perforation, aspiration, infection, or possibility of a missed lesion. After discussions of the risks/benefits and alternatives to this procedure, as well as the planned sedation, the patient was placed in the left lateral decubitus position and begun on continuous blood pressure pulse oximetry and EKG monitoring and this was maintained throughout the procedure. Once an adequate level of sedation was obtained a bite block was placed. Then the lubricated tip of the [id number] diagnostic video upper endoscope was inserted and advanced using direct visualization into the posterior pharynx and ultimately into the esophagus. The scope was then advanced through the stomach and to the second portion of the duodenum. Complications: None    Findings:      1. Esophagus: The squamocolumnar junction was noted at 38 cm and appeared regular. The diaphragmatic pinch was noted noted at 39 cm from the incisors. 1 cm sliding hiatal hernia. The esophageal mucosa appeared healthy and normal. There was no endoscopic findings of esophagitis, stricture or Sultana's esophagus. The esophagus was widely patent with no resistance to scope passage. There was no evidence of inflammation at the the GE junction. Biopsies were taken throughout the esophagus for histology. 2. Stomach: The stomach distended normally. Normal rugal folds were seen. The pylorus was patent.  The gastric mucosa appeared healthy and normal. Retroflexion revealed a normal fundus. There was no ulcers or erosions seen in the entire stomach. Biopsies were taken from the antrum, angularis, body for histology. Hill grade 1.    3. Duodenum: The duodenal mucosa appeared normal in the duodenal bulb. The second portion of the duodenum was notable for small white flecks covering otherwise healthy mucosa. The mucosa appeared healthy and normal otherwise. There was no associated erythema, edema, ulceration. Biopsies were taken with a cold forceps for histology. Impression:  1. Normal appearing esophagus. Biopsied. 2. Small hiatal hernia. 3. Normal appearing stomach. Biopsied. 4. Duodenal mucosa with small white flecks covering otherwise normal appearing mucosa. Possibly lymphangiectasia vs normal variant for this patient. Given atypical symptoms, biopsies were taken with a cold forceps for histology. There was no evidence of esophagitis today while off PPI therapy, therefore cannot make a diagnosis of erosive esophagitis and unclear if she truly has increased gastric acid exposure in the esophagus. Symptoms are rather atypical, intermittent and associated with other symptoms such as joint pains, anxiety, etc. Gastroesophageal reflux disease does not explain all symptoms, I would favor additional rheumatologic workup/further investigation from PCP for her symptoms. If there is ongoing concern that atypical chest pain is related to acid exposure can consider wireless esophageal pH testing. Recommend:  1. Await pathology results. 2. Agree with rheumatology workup  3. Can hold off on Omeprazole since it did not improve your symptoms previously. 4. Consider wireless esophageal pH testing in the future if ongoing concern for acid reflux disease causing symptoms.    >>>If tissue was sampled/removed and you have not received your pathology results either by phone or letter within 2 weeks, please call our office at [phone]. Specimens:  Duodenum, stomach, esophagus.     Blood loss: <1 ml

## 2022-11-18 NOTE — ANESTHESIA POSTPROCEDURE EVALUATION
Patient: Esa Dominguez    Procedure Summary     Date: 11/18/22 Room / Location: [address] [place] [address] ENDOSCOPY 04 / [address] [place] [address] ENDOSCOPY    Anesthesia Start: 1594 Anesthesia Stop:     Procedure: ESOPHAGOGASTRODUODENOSCOPY (EGD) Diagnosis:       Epigastric abdominal pain      (Normal EGD)    Surgeons: Paual Baca MD Anesthesiologist: Pearl Vieira CRNA    Anesthesia Type: MAC ASA Status: 2          Anesthesia Type: No value filed.     Vitals Value Taken Time   /62 11/18/22 1141   Temp 98.4 11/18/22 1141   Pulse 80 11/18/22 1141   Resp 18 11/18/22 1141   SpO2 97 11/18/22 1141       [facility] AN Post Evaluation:   Patient Evaluated in PACU  Patient Participation: complete - patient participated  Level of Consciousness: sleepy but conscious  Pain Score: 0  Pain Management: adequate  Airway Patency:patent  Dental exam unchanged from preop  Yes    Cardiovascular Status: acceptable  Respiratory Status: acceptable  Postoperative Hydration acceptable  Comments:  Report to Shari Linares CRNA  11/18/2022 11:41 AM

## 2022-11-21 ENCOUNTER — MED REC SCAN ONLY (OUTPATIENT)
Facility: CLINIC | Age: 32
End: 2022-11-21

## 2022-11-21 LAB — CALPROTECTIN STL-MCNT: 5.59 ΜG/G (ref ?–50)

## 2022-11-21 NOTE — PROGRESS NOTES
Called patient, left a voicemail. Unremarkable Egd with unrevealing biopsies. We already discussed this after the EGD last Friday. I told her to send me a mychart message with questions.

## 2022-11-29 PROBLEM — E28.2 POLYCYSTIC OVARIES: Status: ACTIVE | Noted: 2018-07-05

## 2022-11-29 PROBLEM — N80.9 ENDOMETRIOSIS: Status: ACTIVE | Noted: 2022-11-29

## 2022-11-29 PROBLEM — F41.9 ANXIETY: Status: ACTIVE | Noted: 2022-11-29

## 2022-12-01 ENCOUNTER — TELEPHONE (OUTPATIENT)
Dept: FAMILY MEDICINE CLINIC | Facility: CLINIC | Age: 32
End: 2022-12-01

## 2022-12-01 DIAGNOSIS — Z13.29 SCREENING FOR ENDOCRINE, METABOLIC AND IMMUNITY DISORDER: Primary | ICD-10-CM

## 2022-12-01 DIAGNOSIS — Z13.0 SCREENING FOR ENDOCRINE, METABOLIC AND IMMUNITY DISORDER: Primary | ICD-10-CM

## 2022-12-01 DIAGNOSIS — Z13.228 SCREENING FOR ENDOCRINE, METABOLIC AND IMMUNITY DISORDER: Primary | ICD-10-CM

## 2022-12-01 NOTE — TELEPHONE ENCOUNTER
Received results from [facility] for pt's US abdomen. Placed in Dr. Diana linda for review.

## 2022-12-06 NOTE — TELEPHONE ENCOUNTER
US shows no abnormality in the area of concern. Please let me know if you have any questions.   [address], DO 12/6/2022 10:18 AM

## 2022-12-06 NOTE — TELEPHONE ENCOUNTER
Patient called back and I gave her the results she also mentioned that she has noticed a decrease in her urine output from starting the Cymbalta. I told her to increase her fluids and if that does not work to notify us. She also would like her kidney function checked as when she was on Wellbutrin she had kidney trouble and she would feel better if she could have this checked.

## 2022-12-07 ENCOUNTER — TELEPHONE (OUTPATIENT)
Dept: FAMILY MEDICINE CLINIC | Facility: CLINIC | Age: 32
End: 2022-12-07

## 2022-12-07 NOTE — TELEPHONE ENCOUNTER
Called LMOM to call back to discuss cough medication, needs to avoid anything with ASA or NSAIDs in it Delsym should be OK to take in the short term.

## 2022-12-07 NOTE — TELEPHONE ENCOUNTER
Patient was recently prescribed Cymbalta and came down with a cold, patient still has really bad chest congestion. Patient wondering which is the best OTC cold medicine to take, when she googled it it stated to stay away from all of them as they may cause bleeding.

## 2022-12-07 NOTE — TELEPHONE ENCOUNTER
Pt c/o cough and congestion x 6 days- had a negative Covid test. No fevers now, no difficulty breathing  Advised fluids, rest, tea with honey, humidifier, Delsym.  Pt to call back if no improvment

## 2022-12-14 ENCOUNTER — TELEPHONE (OUTPATIENT)
Dept: FAMILY MEDICINE CLINIC | Facility: CLINIC | Age: 32
End: 2022-12-14

## 2022-12-14 NOTE — TELEPHONE ENCOUNTER
Pt would like to know if these side effects from symbolta she gets blurred vision, gets dizzy at while sleeping  Wants to know if this is a normal side effect from meds

## 2022-12-14 NOTE — TELEPHONE ENCOUNTER
Has had blurred vision for last week off and on just feel like her vision is not right but it clears up also.  Her dizziness has just started and is not bad

## 2022-12-14 NOTE — TELEPHONE ENCOUNTER
Common side effects  Called and LMOM to call back she might need to stop medication will ask Dr Nevaeh Garces   These common side effects of duloxetine can happen in up to 1 in 10 people.  There are things you can do to help cope with them:    Difficulty sleeping  Headaches  Feeling dizzy  Blurred vision  Constipation  Diarrhoea  Feeling or being sick (nausea or vomiting)  Dry mouth  Sweating  Tiredness  Less appetite than usual and weight loss  Feeling less interested in sex, or having problems keeping an erection or reaching orgasm  Speak to a doctor or pharmacist if the advic

## 2022-12-15 NOTE — TELEPHONE ENCOUNTER
patient called back and she would like to continue the duloxetine as it is working for her and the side effects are intermittent.  She will discuss this with Dr Erin Hayden on 1/3/23

## 2022-12-15 NOTE — TELEPHONE ENCOUNTER
Could be related to the medication although odd it would show up 2 weeks into taking the medication. Could also be vertigo not related to the medication. Hard to tell. We could stop the medication or have her try OTC meclizine and see if that improves the symptoms and if not then discontinue.      Keven Kirk, DO

## 2022-12-16 LAB
ALBUMIN/GLOBULIN RATIO: 1.6 (CALC) (ref 1–2.5)
ALBUMIN: 4.1 G/DL (ref 3.6–5.1)
ALKALINE PHOSPHATASE: 71 U/L (ref 31–125)
ALT: 9 U/L (ref 6–29)
AST: 15 U/L (ref 10–30)
BILIRUBIN, TOTAL: 0.6 MG/DL (ref 0.2–1.2)
BUN: 14 MG/DL (ref 7–25)
CALCIUM: 9.9 MG/DL (ref 8.6–10.2)
CARBON DIOXIDE: 28 MMOL/L (ref 20–32)
CHLORIDE: 106 MMOL/L (ref 98–110)
CREATININE: 0.95 MG/DL (ref 0.5–0.97)
EGFR: 83 ML/MIN/1.73M2
GLOBULIN: 2.6 G/DL (CALC) (ref 1.9–3.7)
GLUCOSE: 91 MG/DL (ref 65–99)
POTASSIUM: 4.6 MMOL/L (ref 3.5–5.3)
PROTEIN, TOTAL: 6.7 G/DL (ref 6.1–8.1)
SODIUM: 141 MMOL/L (ref 135–146)

## 2022-12-20 ENCOUNTER — PATIENT MESSAGE (OUTPATIENT)
Dept: FAMILY MEDICINE CLINIC | Facility: CLINIC | Age: 32
End: 2022-12-20

## 2022-12-20 ENCOUNTER — TELEPHONE (OUTPATIENT)
Dept: FAMILY MEDICINE CLINIC | Facility: CLINIC | Age: 32
End: 2022-12-20

## 2022-12-20 NOTE — TELEPHONE ENCOUNTER
Pt is calling stating that she is taking Symbalta and has a cold and would like to know what she can take that wont interact with that med   pls call to advise

## 2023-01-25 ENCOUNTER — TELEPHONE (OUTPATIENT)
Dept: FAMILY MEDICINE CLINIC | Facility: CLINIC | Age: 33
End: 2023-01-25

## 2023-01-25 NOTE — TELEPHONE ENCOUNTER
Pt is on Cymbalta and she wants to know if ear pressure is a side affect of the medication? 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

## 2023-01-26 NOTE — TELEPHONE ENCOUNTER
I suppose this is possible although not very common. In studies, 9% of teenagers noticed nasopharyngitis and some noted otalgia. However, sinusitis may be from an entirely different cause.

## 2023-01-27 DIAGNOSIS — F41.1 GAD (GENERALIZED ANXIETY DISORDER): ICD-10-CM

## 2023-01-27 NOTE — TELEPHONE ENCOUNTER
Called [facility] in detail about Dr Vijay Rodriguez thoughts.

## 2023-01-30 RX ORDER — DULOXETIN HYDROCHLORIDE 20 MG/1
20 CAPSULE, DELAYED RELEASE ORAL DAILY
Qty: 90 CAPSULE | Refills: 0 | Status: SHIPPED | OUTPATIENT
Start: 2023-01-30

## 2023-02-02 ENCOUNTER — OFFICE VISIT (OUTPATIENT)
Dept: FAMILY MEDICINE CLINIC | Facility: CLINIC | Age: 33
End: 2023-02-02
Payer: COMMERCIAL

## 2023-02-02 VITALS
HEIGHT: 67 IN | TEMPERATURE: 98 F | WEIGHT: 152 LBS | SYSTOLIC BLOOD PRESSURE: 120 MMHG | DIASTOLIC BLOOD PRESSURE: 70 MMHG | RESPIRATION RATE: 16 BRPM | BODY MASS INDEX: 23.86 KG/M2 | HEART RATE: 60 BPM | OXYGEN SATURATION: 99 %

## 2023-02-02 DIAGNOSIS — H69.83 DYSFUNCTION OF BOTH EUSTACHIAN TUBES: Primary | ICD-10-CM

## 2023-02-02 DIAGNOSIS — R42 VERTIGO: ICD-10-CM

## 2023-02-02 PROCEDURE — 3008F BODY MASS INDEX DOCD: CPT | Performed by: FAMILY MEDICINE

## 2023-02-02 PROCEDURE — 99213 OFFICE O/P EST LOW 20 MIN: CPT | Performed by: FAMILY MEDICINE

## 2023-02-02 PROCEDURE — 3078F DIAST BP <80 MM HG: CPT | Performed by: FAMILY MEDICINE

## 2023-02-02 PROCEDURE — 3074F SYST BP LT 130 MM HG: CPT | Performed by: FAMILY MEDICINE

## 2023-02-02 RX ORDER — PREDNISONE 20 MG/1
40 TABLET ORAL DAILY
Qty: 10 TABLET | Refills: 0 | Status: SHIPPED | OUTPATIENT
Start: 2023-02-02 | End: 2023-02-07

## 2023-02-13 ENCOUNTER — PATIENT MESSAGE (OUTPATIENT)
Dept: FAMILY MEDICINE CLINIC | Facility: CLINIC | Age: 33
End: 2023-02-13

## 2023-03-06 ENCOUNTER — OFFICE VISIT (OUTPATIENT)
Facility: CLINIC | Age: 33
End: 2023-03-06
Payer: COMMERCIAL

## 2023-03-06 VITALS — SYSTOLIC BLOOD PRESSURE: 110 MMHG | OXYGEN SATURATION: 92 % | DIASTOLIC BLOOD PRESSURE: 76 MMHG | HEART RATE: 87 BPM

## 2023-03-06 DIAGNOSIS — E28.2 PCOS (POLYCYSTIC OVARIAN SYNDROME): Primary | ICD-10-CM

## 2023-03-06 PROCEDURE — 3074F SYST BP LT 130 MM HG: CPT | Performed by: STUDENT IN AN ORGANIZED HEALTH CARE EDUCATION/TRAINING PROGRAM

## 2023-03-06 PROCEDURE — 3078F DIAST BP <80 MM HG: CPT | Performed by: STUDENT IN AN ORGANIZED HEALTH CARE EDUCATION/TRAINING PROGRAM

## 2023-03-06 PROCEDURE — 99205 OFFICE O/P NEW HI 60 MIN: CPT | Performed by: STUDENT IN AN ORGANIZED HEALTH CARE EDUCATION/TRAINING PROGRAM

## 2023-03-09 ENCOUNTER — OFFICE VISIT (OUTPATIENT)
Dept: OTOLARYNGOLOGY | Facility: CLINIC | Age: 33
End: 2023-03-09

## 2023-03-09 ENCOUNTER — OFFICE VISIT (OUTPATIENT)
Dept: AUDIOLOGY | Facility: CLINIC | Age: 33
End: 2023-03-09

## 2023-03-09 DIAGNOSIS — H93.19 TINNITUS, UNSPECIFIED LATERALITY: Primary | ICD-10-CM

## 2023-03-09 DIAGNOSIS — H93.13 TINNITUS, BILATERAL: Primary | ICD-10-CM

## 2023-03-09 PROCEDURE — 99203 OFFICE O/P NEW LOW 30 MIN: CPT | Performed by: OTOLARYNGOLOGY

## 2023-03-09 RX ORDER — CELECOXIB 200 MG/1
200 CAPSULE ORAL DAILY PRN
Qty: 30 CAPSULE | Refills: 0 | Status: SHIPPED | OUTPATIENT
Start: 2023-03-09 | End: 2023-04-08

## 2023-03-09 RX ORDER — CYCLOBENZAPRINE HCL 5 MG
5 TABLET ORAL NIGHTLY
Qty: 30 TABLET | Refills: 1 | Status: SHIPPED | OUTPATIENT
Start: 2023-03-09

## 2023-03-16 LAB
ACTH, PLASMA: 23 PG/ML (ref 6–50)
CORTISOL, TOTAL: 15.4 MCG/DL
ESTRADIOL: 171 PG/ML
FSH: 3.7 MIU/ML
HEMOGLOBIN A1C: 5 % OF TOTAL HGB
PROLACTIN: 16.2 NG/ML
TESTOSTERONE, TOTAL,$/MS/MS: 30 NG/DL (ref 2–45)
TSH W/REFLEX TO FT4: 2.74 MIU/L

## 2023-03-24 ENCOUNTER — TELEPHONE (OUTPATIENT)
Facility: CLINIC | Age: 33
End: 2023-03-24

## 2023-05-22 ENCOUNTER — TELEMEDICINE (OUTPATIENT)
Dept: FAMILY MEDICINE CLINIC | Facility: CLINIC | Age: 33
End: 2023-05-22

## 2023-05-22 DIAGNOSIS — Z30.015 ENCOUNTER FOR INITIAL PRESCRIPTION OF VAGINAL RING HORMONAL CONTRACEPTIVE: Primary | ICD-10-CM

## 2023-05-22 DIAGNOSIS — J30.2 SEASONAL ALLERGIC RHINITIS, UNSPECIFIED TRIGGER: ICD-10-CM

## 2023-05-22 RX ORDER — AZELASTINE HYDROCHLORIDE 137 UG/1
1 SPRAY, METERED NASAL 2 TIMES DAILY
Qty: 30 ML | Refills: 2 | Status: SHIPPED | OUTPATIENT
Start: 2023-05-22

## 2023-05-22 RX ORDER — ETONOGESTREL AND ETHINYL ESTRADIOL 11.7; 2.7 MG/1; MG/1
1 INSERT, EXTENDED RELEASE VAGINAL
Qty: 3 RING | Refills: 1 | Status: SHIPPED | OUTPATIENT
Start: 2023-05-22

## 2023-05-22 RX ORDER — FLUOXETINE HYDROCHLORIDE 20 MG/1
20 CAPSULE ORAL DAILY
COMMUNITY
Start: 2023-04-18

## 2023-11-14 ENCOUNTER — PATIENT MESSAGE (OUTPATIENT)
Dept: FAMILY MEDICINE CLINIC | Facility: CLINIC | Age: 33
End: 2023-11-14

## 2023-11-14 NOTE — TELEPHONE ENCOUNTER
LOV was telemedicine on 5/22/23. No future appointments. Pt is currently taking Fluoxetine 40 mg daily which is prescribed by psychiatrist.    Pt is asking if you would prescribe Fluoxetine for her instead of the psychiatrist?    Routed to Dr Marialuisa Villalta.

## 2023-11-20 RX ORDER — FLUOXETINE HYDROCHLORIDE 20 MG/1
20 CAPSULE ORAL DAILY
Qty: 90 CAPSULE | Refills: 1 | Status: SHIPPED | OUTPATIENT
Start: 2023-11-20

## 2024-01-04 ENCOUNTER — TELEPHONE (OUTPATIENT)
Dept: FAMILY MEDICINE CLINIC | Facility: CLINIC | Age: 34
End: 2024-01-04

## 2024-01-04 DIAGNOSIS — E28.2 POLYCYSTIC OVARIES: Primary | ICD-10-CM

## 2024-01-04 NOTE — TELEPHONE ENCOUNTER
Patient PCOS has been flaring up and she was wanting a second opinion for an endocrinologist. Her new office that she found is requesting a referral from her doctor and needing medical records and CMP.    Dr. Alexandria Blackwell Endocrin[facility]    [facility]     [phone]    Fax: [phone]

## 2024-03-25 ENCOUNTER — PATIENT MESSAGE (OUTPATIENT)
Dept: FAMILY MEDICINE CLINIC | Facility: CLINIC | Age: 34
End: 2024-03-25

## 2024-03-25 DIAGNOSIS — E28.2 PCOS (POLYCYSTIC OVARIAN SYNDROME): Primary | ICD-10-CM

## 2024-03-25 RX ORDER — METFORMIN HYDROCHLORIDE 500 MG/1
500 TABLET, EXTENDED RELEASE ORAL 2 TIMES DAILY WITH MEALS
Qty: 60 TABLET | Refills: 2 | Status: SHIPPED | OUTPATIENT
Start: 2024-03-25

## 2024-03-25 NOTE — TELEPHONE ENCOUNTER
LOV was telemedicine on 5/22/23.     Future Appointments   Date Time Provider Department Center   5/28/2024  8:40 AM Leonie Alexis, DO [facility] [place]        Pt is frustrated with her weight gain that she attributes to PCOS. She is exercising, trying to make healthy eating choice. She has seen a nutritionist and an endocrinologist.    She is asking if she should try Metformin again.    She wants to try something prior to upcoming Px.    Routed to Dr Alexis.

## 2024-03-25 NOTE — TELEPHONE ENCOUNTER
From: Shayla Conti  To: Leonie Alexis  Sent: 3/25/2024 11:17 AM CDT  Subject: PCOS    Hi Dr. Alexis,  I wanted to send a message regarding weight gain because of my pcos. I am somewhat frustrated because I have been gaining a lot of weight recently regardless of the amount of diet and exercise I am doing. I have paid out of pocket for a nutritionist, gone to an endocrinologist and had labs taken only to show my DHEA is still increased, and didn’t really gain insight on how to handle the weight gain other than to eat healthy and continue seeing the dietician.  I now weigh 167 pounds which is short of the bmi to qualify me for any medicine for weight loss. I know this is the current trend, but I have exhausted all options. I understand Im not obese, but I worry about long term since my mom continues to struggle with the same thing. I know my medicine doesn’t cover weight loss drugs unless I try others, so can I try metformin again even though I have already tried this in the past? I am hoping when I see you in may, I could have tried all options and we can discuss ways that I haven’t tried yet. I worry if I keep gaining weight that I will be unhealthy and I just don’t know what more to do for my PCOS. Thank you for always listening to me and helping me throughout my medical journey. Thank you.

## 2024-04-20 ENCOUNTER — TELEPHONE (OUTPATIENT)
Dept: FAMILY MEDICINE CLINIC | Facility: CLINIC | Age: 34
End: 2024-04-20

## 2024-04-23 ENCOUNTER — OFFICE VISIT (OUTPATIENT)
Dept: FAMILY MEDICINE CLINIC | Facility: CLINIC | Age: 34
End: 2024-04-23
Payer: COMMERCIAL

## 2024-04-23 VITALS
WEIGHT: 169 LBS | SYSTOLIC BLOOD PRESSURE: 124 MMHG | HEART RATE: 88 BPM | RESPIRATION RATE: 16 BRPM | HEIGHT: 67 IN | BODY MASS INDEX: 26.53 KG/M2 | TEMPERATURE: 97 F | DIASTOLIC BLOOD PRESSURE: 66 MMHG

## 2024-04-23 DIAGNOSIS — E28.2 PCOS (POLYCYSTIC OVARIAN SYNDROME): Primary | ICD-10-CM

## 2024-04-23 PROCEDURE — 99213 OFFICE O/P EST LOW 20 MIN: CPT | Performed by: FAMILY MEDICINE

## 2024-04-23 RX ORDER — SPIRONOLACTONE 50 MG/1
50 TABLET, FILM COATED ORAL 2 TIMES DAILY
Qty: 60 TABLET | Refills: 1 | Status: SHIPPED | OUTPATIENT
Start: 2024-04-23

## 2024-04-23 NOTE — PROGRESS NOTES
Chief Complaint:  Chief Complaint   Patient presents with    Follow - Up     Med is not helping, PCOS/weight gain/hormonal changes        HPI:  This is a 33 year old female patient presenting for Follow - Up (Med is not helping, PCOS/weight gain/hormonal changes )    Started seeing psychiatry.  Diagnosed with anxiety and possibly OCD.  Was started on fluoxetine.  Feeling better since getting this under control.  Stopped fluoxetine recently given concerns about weight gain.  Despite stopping, weight continued to increase.    PCOS: For the past 4 months has been gaining weight rapidly. Saw endocrinology- labs showed elevated DHEAS (416) but otherwise unremarkable. Taking metformin but feels this is not helping and is giving some GI side effects. Exercising and meeting with nutritionist.  Feeling really frustrated about weight gain.    Health Maintenance:  Health Maintenance   Topic Date Due    Annual Physical  Never done    DTaP,Tdap,and Td Vaccines (1 - Tdap) Never done    Pap Smear  Never done    COVID-19 Vaccine (4 - 2023-24 season) 09/01/2023    Annual Depression Screening  Never done    Influenza Vaccine (Season Ended) 10/01/2024    Pneumococcal Vaccine: Birth to 64yrs  Aged Out       ROS: Review of systems performed and negative unless stated in HPI.    Past medical, family and social history reviewed and listed as below.    HISTORY:  Past Medical History:    Congenital anomaly of cerebral vessels (HCC)    Endometriosis determined by laparoscopy    Hemorrhoids    IBS (irritable bowel syndrome)    Suspected    Migraines    PCOS (polycystic ovarian syndrome)    PONV (postoperative nausea and vomiting)      Past Surgical History:   Procedure Laterality Date    Laparoscopy,diagnostic      Endometriosis      Family History   Problem Relation Age of Onset    Stroke Maternal Grandmother     Lung Disorder Maternal Grandfather         COPD    Diabetes Paternal Grandfather     Heart Disease Paternal Grandfather        Social History     Socioeconomic History    Marital status:    Occupational History     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 60 tablet 1    Azelastine HCl 137 MCG/SPRAY Nasal Solution 1 spray by Each Nare route 2 (two) times daily. 30 mL 2    cyclobenzaprine 5 MG Oral Tab Take 1 tablet (5 mg total) by mouth nightly. 30 tablet 1    FLUoxetine 20 MG Oral Cap Take 1 capsule (20 mg total) by mouth daily. (Patient not taking: Reported on 4/23/2024) 90 capsule 1     Allergies:  Allergies   Allergen Reactions    Penicillins SWELLING     As a young child, states she had \"swelling all over by body\".  I informed the patient she can discuss PCN skin testing with Dr. Gonzales or when she gets a PCP, pt verbalizes understanding.   As a young child, states she had \"swelling all over by body\".  I informed the patient she can discuss PCN skin testing with Dr. Gonzales or when she gets a PCP, pt verbalizes understanding.       Bupropion NAUSEA AND VOMITING    Morphine NAUSEA AND VOMITING    Sulfamethoxazole W/Trimethoprim NAUSEA AND VOMITING    Iodine (Topical) UNKNOWN     Other reaction(s): UNKNOWN       EXAM:  Vitals:    04/23/24 1758   BP: 124/66   Pulse: 88   Resp: 16   Temp: 97.1 °F (36.2 °C)   Weight: 169 lb (76.7 kg)   Height: 5' 7\" (1.702 m)     GENERAL: vitals reviewed and listed above, alert, oriented, appears well hydrated and in no acute distress  HEENT:  atraumatic, conjunctiva clear, no obvious abnormalities on inspection   LUNGS: clear to auscultation bilaterally, no wheezes, rales or rhonchi, good air movement  CV: HRRR, no murmurs, no peripheral edema   EXTREMITIES: warm and well perfused  PSYCH: pleasant and cooperative, no obvious depression or anxiety    ASSESSMENT AND PLAN:  Discussed the following assessment   Problem List Items Addressed This Visit    None  Visit Diagnoses       PCOS (polycystic ovarian syndrome)    -  Primary    Relevant Medications    spironolactone 50 MG Oral Tab    Other Relevant Orders    Basic Metabolic Panel (8) [E]            Advised the following:  Shayla was seen today for follow - up.    Diagnoses and all orders for this visit:    PCOS (polycystic ovarian syndrome)  Discussed options surrounding PCOS management.  Will discontinue metformin at this time.  Recommend restarting fluoxetine as this is unlikely to be contributing to weight gain at this time.  Will start with spironolactone.  To check BMP in 2 weeks.  Start with 50 mg daily and then increase to twice daily.  Continue working with nutritionist and following healthy diet and exercise  -     spironolactone 50 MG Oral Tab; Take 1 tablet (50 mg total) by mouth 2 (two) times daily.  -     Basic Metabolic Panel (8) [E]      Follow-up in 2 to 3 months  Leonie Alexis DO  4/23/2024 6:05 PM  Family Medicine    Note to Patient  The 21st Century Cures Act makes medical notes like these available to patients in the interest of transparency. However, be advised this is a medical document and is intended as [id number] communication; it is written in medical language and may appear blunt, direct, or contain abbreviations or verbiage that are unfamiliar. Medical documents are intended to carry relevant information, facts as evident, and the clinical opinion of the practitioner.     This report has been produced using speech recognition software, and may contain errors related to  grammar, punctuation, spelling, words or phrases unrecognized or not translated appropriately to text; these errors may be referred to the dictating provider for further clarification and/or addendum as needed.

## 2024-05-10 ENCOUNTER — PATIENT MESSAGE (OUTPATIENT)
Dept: FAMILY MEDICINE CLINIC | Facility: CLINIC | Age: 34
End: 2024-05-10

## 2024-05-10 NOTE — TELEPHONE ENCOUNTER
Patient called and we discussed ticks she got it from her Dog when they went for a walk. She took it off but is worried the head is still there area is red but no target lesion noted she will keep an eye on it for infectiona and clean the area 2 times a day. Will ask Dr Alexis about what she should do.

## 2024-05-20 ENCOUNTER — TELEPHONE (OUTPATIENT)
Dept: FAMILY MEDICINE CLINIC | Facility: CLINIC | Age: 34
End: 2024-05-20

## 2024-05-20 DIAGNOSIS — M54.6 ACUTE MIDLINE THORACIC BACK PAIN: Primary | ICD-10-CM

## 2024-05-20 DIAGNOSIS — R50.9 FEVER, UNSPECIFIED FEVER CAUSE: ICD-10-CM

## 2024-05-20 NOTE — TELEPHONE ENCOUNTER
LOV 4/23/24 with . Endo recently added Wegovy 0.25mg. Thursday Shayla developed a temperature of 100, She was super exhausted and had intermittent mid back pain. Saturday evening her temperature was 101.8 and she did take tylenol. Her energy level is better the last 2 days but her temperature continues, today 100.4. she has no c/o stomach upset, no respiratory symptoms. She is keeping well hydrated and has no difficulty urinating. Her menses began last Monday 5/13/24 and was excruciating with cramping,but that is complete. She went to work today.  We discussed going to immediate care if her symptoms worsened. Shayla verbalized understanding.    She is asking if the symptoms can be from Wegovy. She is concerned about her kidneys due to the mid back pain.  Please advise  routed to

## 2024-05-20 NOTE — TELEPHONE ENCOUNTER
Pt has been taking Wegovy for about 3 weeks now from her endo and has a fever for 5 days and sore back and doesn't know if its from the new med or not

## 2024-05-20 NOTE — TELEPHONE ENCOUNTER
Is she still having intermittent back pain? If so, we could consider a UA and BMP. If not, then likely related to fever and not feeling well.   Unlikely this is caused by wegovy.   Please let me know if you have any questions.  Leoine Alexis,  5/20/2024 5:18 PM

## 2024-05-20 NOTE — TELEPHONE ENCOUNTER
Shayla is still having intermittent mid back pain. You had ordered a BMP 4/23/24 and she is planning on having it done Wednesday 5/22 at [facility]. She will also give a urine. Do you want just a UA or with reflex to culture?    Routed to

## 2024-05-21 RX ORDER — METFORMIN HYDROCHLORIDE 500 MG/1
2 TABLET, EXTENDED RELEASE ORAL DAILY
COMMUNITY
Start: 2024-05-03

## 2024-05-21 RX ORDER — SEMAGLUTIDE 0.25 MG/.5ML
0.25 INJECTION, SOLUTION SUBCUTANEOUS
COMMUNITY
Start: 2024-05-03 | End: 2024-06-28

## 2024-05-23 LAB
APPEARANCE: CLEAR
BILIRUBIN: NEGATIVE
BUN: 10 MG/DL (ref 7–25)
CALCIUM: 9.6 MG/DL (ref 8.6–10.2)
CARBON DIOXIDE: 28 MMOL/L (ref 20–32)
CHLORIDE: 103 MMOL/L (ref 98–110)
COLOR: YELLOW
CREATININE: 0.76 MG/DL (ref 0.5–0.97)
EGFR: 107 ML/MIN/1.73M2
GLUCOSE: 94 MG/DL (ref 65–99)
GLUCOSE: NEGATIVE
KETONES: NEGATIVE
LEUKOCYTE ESTERASE: NEGATIVE
NITRITE: NEGATIVE
OCCULT BLOOD: NEGATIVE
PH: 6.5 (ref 5–8)
POTASSIUM: 4.1 MMOL/L (ref 3.5–5.3)
PROTEIN: NEGATIVE
SODIUM: 137 MMOL/L (ref 135–146)
SPECIFIC GRAVITY: 1.01 (ref 1–1.03)

## 2024-11-21 ENCOUNTER — MED REC SCAN ONLY (OUTPATIENT)
Dept: FAMILY MEDICINE CLINIC | Facility: CLINIC | Age: 34
End: 2024-11-21

## 2025-03-25 ENCOUNTER — TELEPHONE (OUTPATIENT)
Dept: FAMILY MEDICINE CLINIC | Facility: CLINIC | Age: 35
End: 2025-03-25

## 2025-03-25 NOTE — TELEPHONE ENCOUNTER
Pt is calling she found 2-3 cysts on her lower back and is concerned.  She wants to know if she should see Dr. Alexis or go to a dermatologist? Please call.

## 2025-03-25 NOTE — TELEPHONE ENCOUNTER
In the lower back, there are 2 to 3 cysts underneath skin. No redness. They are soft and mobile. Appointment made for 3/31/25

## 2025-03-31 ENCOUNTER — OFFICE VISIT (OUTPATIENT)
Dept: FAMILY MEDICINE CLINIC | Facility: CLINIC | Age: 35
End: 2025-03-31
Payer: COMMERCIAL

## 2025-03-31 ENCOUNTER — TELEPHONE (OUTPATIENT)
Dept: FAMILY MEDICINE CLINIC | Facility: CLINIC | Age: 35
End: 2025-03-31

## 2025-03-31 VITALS
SYSTOLIC BLOOD PRESSURE: 114 MMHG | BODY MASS INDEX: 19.78 KG/M2 | WEIGHT: 126 LBS | DIASTOLIC BLOOD PRESSURE: 60 MMHG | OXYGEN SATURATION: 99 % | RESPIRATION RATE: 18 BRPM | HEIGHT: 67 IN | HEART RATE: 56 BPM

## 2025-03-31 DIAGNOSIS — D17.1 LIPOMA OF BUTTOCK: ICD-10-CM

## 2025-03-31 DIAGNOSIS — E55.9 VITAMIN D DEFICIENCY: Primary | ICD-10-CM

## 2025-03-31 DIAGNOSIS — M54.50 ACUTE BILATERAL LOW BACK PAIN WITHOUT SCIATICA: Primary | ICD-10-CM

## 2025-03-31 DIAGNOSIS — Z00.00 ROUTINE HEALTH MAINTENANCE: ICD-10-CM

## 2025-03-31 PROBLEM — R63.5 WEIGHT GAIN: Status: RESOLVED | Noted: 2017-11-22 | Resolved: 2025-03-31

## 2025-03-31 RX ORDER — CYCLOBENZAPRINE HCL 5 MG
5 TABLET ORAL NIGHTLY PRN
Qty: 10 TABLET | Refills: 0 | Status: SHIPPED | OUTPATIENT
Start: 2025-03-31

## 2025-03-31 RX ORDER — SEMAGLUTIDE 1.7 MG/.75ML
INJECTION, SOLUTION SUBCUTANEOUS
COMMUNITY

## 2025-03-31 NOTE — PATIENT INSTRUCTIONS
Take one cyclobenzaprine tab nightly as needed. This medication will likely make you drowsy. Do not drive, operate machinery or make important decisions after taking this medication. Do not drink alcohol while taking this medication.

## 2025-03-31 NOTE — PROGRESS NOTES
Chief Complaint   Patient presents with    Lump     Per pt has lumps on lower back x 1 week    Low Back Pain     X 2 months      HPI:  Presents with approx 2 month history of lower back pain, present constantly but worse in the mornings. Did recently get new mattress. Denies known injury to back. Denies radiation of pain down buttocks or legs. Denies N/T/weakness of legs or other body parts. Denies loss of bowel or bladder control. Has been treating with anything    Also, reports approx 1 week history of feeling 2 lumps to right lower back/ upper buttock area. Denies masses are painful but is not sure if they are contributing to her back pain. Denies injury to lump areas. Has not treated with anything.     Past Medical History:    Allergic rhinitis    Anxiety    Congenital anomaly of cerebral vessels (HCC)    Endometriosis determined by laparoscopy    Esophageal reflux    Hemorrhoids    IBS (irritable bowel syndrome)    Suspected    Migraines    PCOS (polycystic ovarian syndrome)    PONV (postoperative nausea and vomiting)       Patient Active Problem List   Diagnosis    Allergic rhinitis    Dysmenorrhea    Female dyspareunia    High-tone pelvic floor dysfunction    History of recurrent miscarriages    Irregular menses    Left breast lump    Polycystic ovaries    Weight gain    Pre-op testing    Anxiety    Endometriosis       Current Outpatient Medications   Medication Sig Dispense Refill    WEGOVY 1.7 MG/0.75ML Subcutaneous Solution Auto-injector INJECT 1.7 MG EVERY 7 DAYS UNDER THE SKIN      cyclobenzaprine 5 MG Oral Tab Take 1 tablet (5 mg total) by mouth nightly as needed for Muscle spasms. 10 tablet 0    FLUoxetine 20 MG Oral Cap Take 1 capsule (20 mg total) by mouth daily. (Patient not taking: Reported on 3/31/2025) 90 capsule 1       Physical Exam  /60   Pulse 56   Resp 18   Ht 5' 7\" (1.702 m)   Wt 126 lb (57.2 kg)   LMP 03/22/2025 (Exact Date)   SpO2 99%   BMI 19.73 kg/m²   Constitutional: well  developed, well nourished, in no apparent distress  HEENT: Normocephalic and atraumatic.   Cardiovascular: Normal rate, regular rhythm.  No murmur.   Pulmonary/Chest: No respiratory distress. Effort normal. Breath sounds clear bilaterally. No wheezes, rhonchi or rales  MS: Low back is non-TTP, without masses or obvious deformity. Bilateral SLR (-) to reproduce pain. Bilateral internal/external rotation (-) to reproduce pain.    Ext: ROM bilateral legs WNL. Muscle strength bilat legs 5/5. No edema, erythema noted to bilateral legs.  Skin: Skin is warm and dry. No pallor. No rash noted. Right upper buttock area with approx 2cm moveable, non-tender subcutaneous mass w/o associated edema, erythema, fluctuance or induration. Inferior to this in right buttock is separate approx 1cm moveable, non-tender subcutaneous mass w/o associated edema, erythema, fluctuance or induration.     A/P:    Encounter Diagnoses   Name Primary?    Acute bilateral low back pain without sciatica- Cyclobenzaprine nightly PRN. Warned pt about sedation with this medication and advised pt about necessary precautions and activities to avoid. Back stretches discussed and handout (with photos) provided. Referred to physical therapy for eval and treat. Instructed to notify office if not improved with these measures or if symptoms worsen. Verbalized understanding of instructions and agreeable to this plan of care.     Yes    Lipoma of buttock- discussed no need for intervention at this time. Continue to monitor. Verbalized understanding of instructions and agreeable to this plan of care.           No orders of the defined types were placed in this encounter.      Meds & Refills for this Visit:  Requested Prescriptions     Signed Prescriptions Disp Refills    cyclobenzaprine 5 MG Oral Tab 10 tablet 0     Sig: Take 1 tablet (5 mg total) by mouth nightly as needed for Muscle spasms.       Imaging & Consults:  PHYSICAL THERAPY EXTERNAL    No follow-ups on  file.  Patient Instructions                 Take one cyclobenzaprine tab nightly as needed. This medication will likely make you drowsy. Do not drive, operate machinery or make important decisions after taking this medication. Do not drink alcohol while taking this medication.      All questions were answered and the patient understands the plan.

## 2025-03-31 NOTE — TELEPHONE ENCOUNTER
Please enter lab orders for the patient's upcoming physical appointment.     Physical scheduled:   Your appointments       Date & Time Appointment Department ([place])    May 20, 2025 4:20 PM CDT Adult Physical with Leonie Alexis DO [facility], [facility] ([facility] [place])    PLEASE NOTE - Most insurances allow a Complete Physical once every 366 days. Please schedule accordingly.    Please arrive 15 minutes prior to your scheduled appointment. Please also bring your Insurance card, Photo ID, and your medication bottles or a list of your current medication.    If you no longer require this appointment, please contact your physician office to cancel.              [facility], [facility] [place]  [address] [place] [address] [place] [id number] [phone]           Preferred lab: [facility]     The patient has been notified to complete fasting labs prior to their physical appointment.

## 2025-05-04 LAB
ABSOLUTE BASOPHILS: 38 CELLS/UL (ref 0–200)
ABSOLUTE EOSINOPHILS: 371 CELLS/UL (ref 15–500)
ABSOLUTE LYMPHOCYTES: 1933 CELLS/UL (ref 850–3900)
ABSOLUTE MONOCYTES: 563 CELLS/UL (ref 200–950)
ABSOLUTE NEUTROPHILS: 3494 CELLS/UL (ref 1500–7800)
ALBUMIN/GLOBULIN RATIO: 1.6 (CALC) (ref 1–2.5)
ALBUMIN: 4.1 G/DL (ref 3.6–5.1)
ALKALINE PHOSPHATASE: 50 U/L (ref 31–125)
ALT: 7 U/L (ref 6–29)
AST: 12 U/L (ref 10–30)
BASOPHILS: 0.6 %
BILIRUBIN, TOTAL: 0.6 MG/DL (ref 0.2–1.2)
BUN: 8 MG/DL (ref 7–25)
CALCIUM: 9.6 MG/DL (ref 8.6–10.2)
CARBON DIOXIDE: 28 MMOL/L (ref 20–32)
CHLORIDE: 106 MMOL/L (ref 98–110)
CHOL/HDLC RATIO: 1.9 (CALC)
CHOLESTEROL, TOTAL: 97 MG/DL
CREATININE: 0.88 MG/DL (ref 0.5–0.97)
EGFR: 89 ML/MIN/1.73M2
EOSINOPHILS: 5.8 %
GLOBULIN: 2.5 G/DL (CALC) (ref 1.9–3.7)
GLUCOSE: 77 MG/DL (ref 65–99)
HDL CHOLESTEROL: 50 MG/DL
HEMATOCRIT: 42.3 % (ref 35–45)
HEMOGLOBIN A1C: 5.2 %
HEMOGLOBIN: 13.4 G/DL (ref 11.7–15.5)
LDL-CHOLESTEROL: 36 MG/DL (CALC)
LYMPHOCYTES: 30.2 %
MCH: 29.6 PG (ref 27–33)
MCHC: 31.7 G/DL (ref 32–36)
MCV: 93.4 FL (ref 80–100)
MONOCYTES: 8.8 %
MPV: 11.5 FL (ref 7.5–12.5)
NEUTROPHILS: 54.6 %
NON-HDL CHOLESTEROL: 47 MG/DL (CALC)
PLATELET COUNT: 379 THOUSAND/UL (ref 140–400)
POTASSIUM: 4.9 MMOL/L (ref 3.5–5.3)
PROTEIN, TOTAL: 6.6 G/DL (ref 6.1–8.1)
RDW: 12.5 % (ref 11–15)
RED BLOOD CELL COUNT: 4.53 MILLION/UL (ref 3.8–5.1)
SODIUM: 140 MMOL/L (ref 135–146)
TRIGLYCERIDES: 38 MG/DL
TSH W/REFLEX TO FT4: 1.42 MIU/L
VITAMIN D, 25-OH, TOTAL: 36 NG/ML (ref 30–100)
WHITE BLOOD CELL COUNT: 6.4 THOUSAND/UL (ref 3.8–10.8)

## 2025-05-06 ENCOUNTER — TELEPHONE (OUTPATIENT)
Dept: FAMILY MEDICINE CLINIC | Facility: CLINIC | Age: 35
End: 2025-05-06

## 2025-05-27 ENCOUNTER — OFFICE VISIT (OUTPATIENT)
Dept: FAMILY MEDICINE CLINIC | Facility: CLINIC | Age: 35
End: 2025-05-27
Payer: COMMERCIAL

## 2025-05-27 VITALS
HEART RATE: 66 BPM | RESPIRATION RATE: 16 BRPM | BODY MASS INDEX: 19.47 KG/M2 | TEMPERATURE: 98 F | WEIGHT: 122.63 LBS | OXYGEN SATURATION: 98 % | DIASTOLIC BLOOD PRESSURE: 76 MMHG | SYSTOLIC BLOOD PRESSURE: 100 MMHG | HEIGHT: 66.5 IN

## 2025-05-27 DIAGNOSIS — F41.9 ANXIETY: ICD-10-CM

## 2025-05-27 DIAGNOSIS — Z00.00 WELL WOMAN EXAM WITHOUT GYNECOLOGICAL EXAM: Primary | ICD-10-CM

## 2025-05-27 DIAGNOSIS — F42.2 MIXED OBSESSIONAL THOUGHTS AND ACTS: ICD-10-CM

## 2025-05-27 PROCEDURE — 99395 PREV VISIT EST AGE 18-39: CPT | Performed by: FAMILY MEDICINE

## 2025-05-27 PROCEDURE — 99213 OFFICE O/P EST LOW 20 MIN: CPT | Performed by: FAMILY MEDICINE

## 2025-05-27 NOTE — PROGRESS NOTES
SUBJECTIVE:  Chief Complaint   Patient presents with    Physical     Women's wellness annual examination      HPI:  History of Present Illness  Shayla Conti is a 34 year old female with OCD and anxiety who presents for an annual physical exam.    Her OCD and anxiety have worsened over the past month. She previously took Prozac but had discontinued it for a while. She restarted Prozac at 10 mg to help with panic attacks, but has not noticed significant improvement. 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 No depressive symptoms, but significant anxiety, particularly health anxiety and OCD tendencies, are triggered by health-related concerns and natural disasters. A history of severe health anxiety is exacerbated by social media and external information.    She is currently on Wegovy, prescribed by her endocrinologist for PCOS and weight management. She reports significant improvement in her menstrual cycle, which is now regular. Initially, she experienced side effects such as constipation, which have since resolved. She is concerned about the long-term use of Wegovy, especially regarding weight loss and potential insurance issues, as she has consistently lost weight and does not wish to lose more. She is on a maintenance dose.    Her recent lab work showed a vitamin D level of 36, a slightly low MCHC, but otherwise normal CBC, lipid panel, TSH, CMP, and hemoglobin A1c of 5.2%.    She has no family history of colon or breast cancer. She is considering getting an IUD and is in regular contact with her gynecologist. She is planning a trip to [facility] with her mother for two weeks.    Chronic conditions:  -PCOS: On wegovy. Following with endocrinology. This has been helpful.  -OCD/anxiety: On fluoxetine. Denies S/HI.     Health Maintenance:  Vaccines: reviewed as below. Indicated today: Tdap?  Immunization History   Administered Date(s) Administered    Covid-19  Vaccine Pfizer 30 mcg/0.3 ml 03/23/2021, 04/13/2021, 12/03/2021     Obesity screening: Body mass index is 19.49 kg/m².  Diabetes screening:    Lab Results   Component Value Date    A1C 5.2 05/03/2025      Hypercholesterolemia screening:   Lab Results   Component Value Date    HDL 50 05/03/2025    HDL 58 06/29/2022     Lab Results   Component Value Date    LDL 36 05/03/2025    LDL 78 06/29/2022     Lab Results   Component Value Date    TRIG 38 05/03/2025    TRIG 54 06/29/2022        Depression screen: no concerns  Osteoporosis: No history of pathologic fractures. No steroid use, smoking, risk of falls, excessive alcohol use.  Cervical Cancer screening: Last Pap: 04/29/2022 - Keara Wynn ([facility])  Colon Cancer screening: Family history of colon cancer? no Last colonoscopy: -   Breast Cancer screening: Family history of breast cancer? no Last mammogram: -   STI: Desires testing for STIs? no  Tobacco use:  reports that she has never smoked. She has never used smokeless tobacco.    ROS: Negative unless stated above    HISTORY:  Past Medical History[1]   Past Surgical History[2]   Family History[3]   Short Social Hx on File[4]     Allergies:  Allergies[5]    OBJECTIVE:  PHYSICAL EXAM:  Vitals:    05/27/25 1205   BP: 100/76   BP Location: Left arm   Pulse: 66   Resp: 16   Temp: 97.8 °F (36.6 °C)   TempSrc: Oral   SpO2: 98%   Weight: 122 lb 9.6 oz (55.6 kg)   Height: 5' 6.5\" (1.689 m)     Physical Examination: General appearance - alert, well appearing, and in no distress and normal appearing weight  Mental status - alert, oriented to person, place, and time, normal mood, behavior, speech, dress, motor activity, and thought processes  Ears - bilateral TM's and external ear canals normal  Neck - supple, no significant adenopathy  Chest - clear to auscultation, no wheezes, rales or rhonchi, symmetric air entry  Heart - normal rate, regular rhythm, normal S1, S2, no murmurs, rubs, clicks or  gallops  Abdomen - soft, nontender, nondistended, no masses or organomegaly  Extremities - peripheral pulses normal, no pedal edema, no clubbing or cyanosis    Results  LABS  Vitamin D: 36 ng/mL (05/03/2025)  CBC: Slightly low MCHC (05/03/2025)  TSH: 1.42 µIU/mL (05/03/2025)  Hemoglobin A1c: 5.2% (05/03/2025)    ASSESSMENT & PLAN:  Shayla Conti is a 34 year old female is here for Physical (Women's wellness annual examination )    1. Well woman exam without gynecological exam (Primary)  2. Anxiety  -     FLUoxetine HCl; Take 1 capsule (20 mg total) by mouth daily.  Dispense: 90 capsule; Refill: 0  3. Mixed obsessional thoughts and acts    Assessment & Plan  Obsessive-Compulsive Disorder (OCD)  OCD symptoms have worsened, particularly with health anxiety and environmental triggers such as weather. Increased anxiety due to a new leadership role at work. Previously on fluoxetine 20 mg, which was effective. Currently on fluoxetine 10 mg, but not seeing significant improvement. No depressive symptoms reported, primarily experiencing anxiety and obsessive behaviors.  - Increase fluoxetine to 20 mg daily.  - Reassess in 2-4 weeks to determine efficacy and consider increasing to 40 mg if needed.  - Monitor for any mood changes or side effects.    Anxiety Disorder  Anxiety has increased, likely due to work-related stress and OCD triggers. No depressive symptoms, primarily experiencing anxiety and obsessive behaviors. Social media and health-related content exacerbate anxiety.  - Increase fluoxetine to 20 mg daily as part of OCD management, which will also address anxiety symptoms.    Polycystic Ovary Syndrome (PCOS)  PCOS symptoms have improved with Wegovy, including normalization of menstrual cycles. Experienced weight loss and concerned about long-term use and potential side effects. No current thyroid issues noted, but aware of potential risks associated with Wegovy, including thyroid cancer in those with genetic  predisposition. No vision-related side effects noted, but aware of rare reports of psychosis or paul.  - Continue Wegovy as prescribed by endocrinologist.  - Monitor weight to prevent excessive loss and discuss with endocrinologist about potential dose adjustments.  - Be vigilant for any thyroid-related symptoms and report any lumps or bumps.  - Monitor for any mood changes or vision-related issues.    Recording duration: 18 minutes    Call or return to clinic prn if these symptoms worsen or fail to improve as anticipated. RTC in 3 months.   Leonie Alexis DO  5/27/2025 12:24 PM    Note to Patient  The 21st Century Cures Act makes medical notes like these available to patients in the interest of transparency. However, be advised this is a medical document and is intended as [id number] communication; it is written in medical language and may appear blunt, direct, or contain abbreviations or verbiage that are unfamiliar. Medical documents are intended to carry relevant information, facts as evident, and the clinical opinion of the practitioner.     This report has been produced using speech recognition software and AI generated text, and may contain errors related to grammar, punctuation, spelling, words or phrases unrecognized or not translated appropriately to text; these errors may be referred to the dictating provider for further clarification and/or addendum as needed.         [1]   Past Medical History:   Allergic rhinitis    Anxiety    Congenital anomaly of cerebral vessels (HCC)    Endometriosis determined by laparoscopy    Esophageal reflux    Hemorrhoids    IBS (irritable bowel syndrome)    Suspected    Migraines    PCOS (polycystic ovarian syndrome)    PONV (postoperative nausea and vomiting)   [2]   Past Surgical History:  Procedure Laterality Date    Laparoscopy,diagnostic      Endometriosis    Other surgical history  2020    Endometrial   [3]   Family History  Problem Relation Age of Onset    Stroke  Maternal Grandmother     Lung Disorder Maternal Grandfather         COPD    Diabetes Paternal Grandfather     Heart Disease Paternal Grandfather    [4]   Social History  Socioeconomic History    Marital status:    Occupational History     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   Tobacco Use    Smoking status: Never    Smokeless tobacco: Never   Vaping Use    Vaping status: Never Used   Substance and Sexual Activity    Alcohol use: Yes     Alcohol/week: 1.0 standard drink of alcohol     Types: 1 Standard drinks or equivalent per week     Comment: occ    Drug use: Never   Other Topics Concern    Caffeine Concern Yes    Stress Concern Yes     Comment: Anxiety    Weight Concern Yes     Comment: Been better    Special Diet No    Exercise Yes    Seat Belt Yes    Self-Exams Yes   [5]   Allergies  Allergen Reactions    Penicillins SWELLING     As a young child, states she had \"swelling all over by body\".  I informed the patient she can discuss PCN skin testing with Dr. Gonzales or when she gets a PCP, pt verbalizes understanding.   As a young child, states she had \"swelling all over by body\".  I informed the patient she can discuss PCN skin testing with Dr. Gonzales or when she gets a PCP, pt verbalizes understanding.       Bupropion NAUSEA AND VOMITING    Morphine NAUSEA AND VOMITING    Sulfamethoxazole W/Trimethoprim NAUSEA AND VOMITING    Iodine (Topical) UNKNOWN     Other reaction(s): UNKNOWN

## 2025-05-27 NOTE — PROGRESS NOTES
The following individual(s) verbally consented to be recorded using ambient AI listening technology and understand that they can each withdraw their consent to this listening technology at any point by asking the clinician to turn off or pause the recording:    Patient name: Shayla Lange Gallito

## 2025-08-04 ENCOUNTER — TELEMEDICINE (OUTPATIENT)
Dept: FAMILY MEDICINE CLINIC | Facility: CLINIC | Age: 35
End: 2025-08-04

## 2025-08-04 DIAGNOSIS — F41.9 ANXIETY: ICD-10-CM

## 2025-08-04 PROCEDURE — 99213 OFFICE O/P EST LOW 20 MIN: CPT | Performed by: FAMILY MEDICINE

## 2025-08-04 RX ORDER — FLUOXETINE HYDROCHLORIDE 40 MG/1
40 CAPSULE ORAL DAILY
Qty: 90 CAPSULE | Refills: 1 | Status: SHIPPED | OUTPATIENT
Start: 2025-08-04

## 2025-08-24 DIAGNOSIS — F41.9 ANXIETY: ICD-10-CM

## (undated) DEVICE — FORCEP RADIAL JAW 4

## (undated) DEVICE — KIT ENDO ORCAPOD 160/180/190

## (undated) DEVICE — 35 ML SYRINGE REGULAR TIP: Brand: MONOJECT

## (undated) DEVICE — CONMED SCOPE SAVER BITE BLOCK, 20X27 MM: Brand: SCOPE SAVER

## (undated) DEVICE — LINE MNTR ADLT SET O2 INTMD

## (undated) DEVICE — KIT CLEAN ENDOKIT 1.1OZ GOWNX2

## (undated) DEVICE — MEDI-VAC NON-CONDUCTIVE SUCTION TUBING 6MM X 1.8M (6FT.) L: Brand: CARDINAL HEALTH

## (undated) NOTE — LETTER
3/24/2023              [address] Gill[address] Shepherd [id number]         Dear Colt Peres,    [facility] records indicate that the tests ordered for you by Gm Morrell MD  have not been done. If you have, in fact, already completed the tests or you do not wish to have the tests done, please contact our office at [address] [place] [address]. Otherwise, please proceed with the testing.   Enclosed is a duplicate order for your convenience    Please call [address] [place] [address] at [phone] to schedule this test order    CALPROTECTIN, FECAL (Order #[id number]) on 11/9/22      Sincerely,    Gm Morrell MD  [facility], [address] [address] [phone]

## (undated) NOTE — LETTER
[address]  [address] [facility] [place]  Authorization for Invasive Procedure                                                                                           1. I hereby authorize Kaylen Moreno MD, my physician and his/her assistants (if applicable), which may include medical students, residents, and/or fellows, to perform the following surgical operation/ procedure and administer such anesthesia as may be determined necessary by my physician: Operation/Procedure name (s) ESOPHAGOGASTRODUODENOSCOPY (EGD) on [facility]   2. I recognize that during the surgical operation/procedure, unforeseen conditions may necessitate additional or different procedures than those listed above. I, therefore, further authorize and request that the above-named surgeon, assistants, or designees perform such procedures as are, in their judgment, necessary and desirable. 3.   My surgeon/physician has discussed prior to my surgery the potential benefits, risks and side effects of this procedure; the likelihood of achieving goals; and potential problems that might occur during recuperation. They also discussed reasonable alternatives to the procedure, including risks, benefits, and side effects related to the alternatives and risks related to not receiving this procedure. I have had all my questions answered and I acknowledge that no guarantee has been made as to the result that may be obtained. 4.   Should the need arise during my operation/procedure, which includes change of level of care prior to discharge, I also consent to the administration of blood and/or blood products. Further, I understand that despite careful testing and screening of blood or blood products by collecting agencies, I may still be subject to ill effects as a result of receiving a blood transfusion and/or blood products.   The following are some, but not all, of the potential risks that can occur: fever and allergic reactions, hemolytic reactions, transmission of diseases such as Hepatitis, AIDS and Cytomegalovirus (CMV) and fluid overload. In the event that I wish to have an autologous transfusion of my own blood, or a directed donor transfusion, I will discuss this with my physician. Check only if Refusing Blood or Blood Products  I understand refusal of blood or blood products as deemed necessary by my physician may have serious consequences to my condition to include possible death. I hereby assume responsibility for my refusal and release the hospital, its personnel, and my physicians from any responsibility for the consequences of my refusal.           ____ Refuse      5. I authorize the use of any specimen, organs, tissues, body parts or foreign objects that may be removed from my body during the operation/procedure for diagnosis, research or teaching purposes and their subsequent disposal by hospital authorities. I also authorize the release of specimen test results and/or written reports to my treating physician on the hospital medical staff or other referring or consulting physicians involved in my care, at the discretion of the Pathologist or my treating physician. 6.   I consent to the photographing or videotaping of the operations or procedures to be performed, including appropriate portions of my body for medical, scientific, or educational purposes, provided my identity is not revealed by the pictures or by descriptive texts accompanying them. If the procedure has been photographed/videotaped, the surgeon will obtain the original picture, image, videotape or CD. The hospital will not be responsible for storage, release or maintenance of the picture, image, tape or CD.    7.   I consent to the presence of a  or observers in the operating room as deemed necessary by my physician or their designees.     8.   I recognize that in the event my procedure results in extended X-Ray/fluoroscopy time, I may develop a skin reaction. 9. If I have a Do Not Attempt Resuscitation (DNAR) order in place, that status will be suspended while in the operating room, procedural suite, and during the recovery period unless otherwise explicitly stated by me (or a person authorized to consent on my behalf). The surgeon or my attending physician will determine when the applicable recovery period ends for purposes of reinstating the DNAR order. 10. Patients having a sterilization procedure: I understand that if the procedure is successful the results will be permanent and it will therefore be impossible for me to inseminate, conceive, or bear children. I also understand that the procedure is intended to result in sterility, although the result has not been guaranteed. 11. I acknowledge that my physician has explained sedation/analgesia administration to me including the risk and benefits I consent to the administration of sedation/analgesia as may be necessary or desirable in the judgment of my physician. I CERTIFY THAT I HAVE READ AND FULLY UNDERSTAND THE ABOVE CONSENT TO OPERATION and/or OTHER PROCEDURE.     _________________________________________ _________________________________     ___________________________________  Signature of Patient     Signature of Responsible Person                   Printed Name of Responsible Person                              _________________________________________ ______________________________        ___________________________________  Signature of Witness         Date  Time         Relationship to Patient    STATEMENT OF PHYSICIAN My signature below affirms that prior to the time of the procedure; I have explained to the patient and/or his/her legal representative, the risks and benefits involved in the proposed treatment and any reasonable alternative to the proposed treatment.  I have also explained the risks and benefits involved in refusal of the proposed treatment and alternatives to the proposed treatment and have answered the patient's questions.  If I have a significant financial interest in a co-management agreement or a significant financial interest in any product or implant, or other significant relationship used in this procedure/surgery, I have disclosed this and had a discussion with my patient.     _______________________________________________________________ _____________________________  Mary Sunita of Physician)                                                                                         (Date)                                   (Time)  Patient Name: Dayton Hussein    : 1990   Printed: 2022      Medical Record #: [id number]                                              Page 1 of 1

## (undated) NOTE — LETTER
[address] [facility] [address] [place], [place]  Authorization for Invasive Procedure                                                                                           1. I hereby authorize Rosie Wiley MD, my physician and his/her assistants (if applicable), which may include medical students, residents, and/or fellows, to perform the following surgical operation/ procedure and administer such anesthesia as may be determined necessary by my physician: Operation/Procedure name (s) ESOPHAGOGASTRODUODENOSCOPY (EGD) on [facility]   2. I recognize that during the surgical operation/procedure, unforeseen conditions may necessitate additional or different procedures than those listed above. I, therefore, further authorize and request that the above-named surgeon, assistants, or designees perform such procedures as are, in their judgment, necessary and desirable. 3.   My surgeon/physician has discussed prior to my surgery the potential benefits, risks and side effects of this procedure; the likelihood of achieving goals; and potential problems that might occur during recuperation. They also discussed reasonable alternatives to the procedure, including risks, benefits, and side effects related to the alternatives and risks related to not receiving this procedure. I have had all my questions answered and I acknowledge that no guarantee has been made as to the result that may be obtained. 4.   Should the need arise during my operation/procedure, which includes change of level of care prior to discharge, I also consent to the administration of blood and/or blood products. Further, I understand that despite careful testing and screening of blood or blood products by collecting agencies, I may still be subject to ill effects as a result of receiving a blood transfusion and/or blood products.   The following are some, but not all, of the potential risks that can occur: fever and allergic reactions, hemolytic reactions, transmission of diseases such as Hepatitis, AIDS and Cytomegalovirus (CMV) and fluid overload. In the event that I wish to have an autologous transfusion of my own blood, or a directed donor transfusion, I will discuss this with my physician. Check only if Refusing Blood or Blood Products  I understand refusal of blood or blood products as deemed necessary by my physician may have serious consequences to my condition to include possible death. I hereby assume responsibility for my refusal and release the hospital, its personnel, and my physicians from any responsibility for the consequences of my refusal.    o  Refuse   5. I authorize the use of any specimen, organs, tissues, body parts or foreign objects that may be removed from my body during the operation/procedure for diagnosis, research or teaching purposes and their subsequent disposal by hospital authorities. I also authorize the release of specimen test results and/or written reports to my treating physician on the hospital medical staff or other referring or consulting physicians involved in my care, at the discretion of the Pathologist or my treating physician. 6.   I consent to the photographing or videotaping of the operations or procedures to be performed, including appropriate portions of my body for medical, scientific, or educational purposes, provided my identity is not revealed by the pictures or by descriptive texts accompanying them. If the procedure has been photographed/videotaped, the surgeon will obtain the original picture, image, videotape or CD. The hospital will not be responsible for storage, release or maintenance of the picture, image, tape or CD.    7.   I consent to the presence of a  or observers in the operating room as deemed necessary by my physician or their designees.     8.   I recognize that in the event my procedure results in extended X-Ray/fluoroscopy time, I may develop a skin reaction. 9. If I have a Do Not Attempt Resuscitation (DNAR) order in place, that status will be suspended while in the operating room, procedural suite, and during the recovery period unless otherwise explicitly stated by me (or a person authorized to consent on my behalf). The surgeon or my attending physician will determine when the applicable recovery period ends for purposes of reinstating the DNAR order. 10. Patients having a sterilization procedure: I understand that if the procedure is successful the results will be permanent and it will therefore be impossible for me to inseminate, conceive, or bear children. I also understand that the procedure is intended to result in sterility, although the result has not been guaranteed. 11. I acknowledge that my physician has explained sedation/analgesia administration to me including the risk and benefits I consent to the administration of sedation/analgesia as may be necessary or desirable in the judgment of my physician. I CERTIFY THAT I HAVE READ AND FULLY UNDERSTAND THE ABOVE CONSENT TO OPERATION and/or OTHER PROCEDURE.     _________________________________________ _________________________________     ___________________________________  Signature of Patient     Signature of Responsible Person                   Printed Name of Responsible Person                              _________________________________________ ______________________________        ___________________________________  Signature of Witness         Date  Time         Relationship to Patient    STATEMENT OF PHYSICIAN My signature below affirms that prior to the time of the procedure; I have explained to the patient and/or his/her legal representative, the risks and benefits involved in the proposed treatment and any reasonable alternative to the proposed treatment.  I have also explained the risks and benefits involved in refusal of the proposed treatment and alternatives to the proposed treatment and have answered the patient's questions.  If I have a significant financial interest in a co-management agreement or a significant financial interest in any product or implant, or other significant relationship used in this procedure/surgery, I have disclosed this and had a discussion with my patient.     _______________________________________________________________ _____________________________  (Signature of Physician)                                                                                         (Date)                                   (Time)  Patient Name: Hailey Wu    : 1990   Printed: 2022      Medical Record #: [id number]                                              Page 1 of 1